# Patient Record
Sex: FEMALE | Race: BLACK OR AFRICAN AMERICAN | NOT HISPANIC OR LATINO
[De-identification: names, ages, dates, MRNs, and addresses within clinical notes are randomized per-mention and may not be internally consistent; named-entity substitution may affect disease eponyms.]

---

## 2021-07-12 ENCOUNTER — APPOINTMENT (OUTPATIENT)
Dept: OBGYN | Facility: CLINIC | Age: 36
End: 2021-07-12
Payer: COMMERCIAL

## 2021-07-12 ENCOUNTER — LABORATORY RESULT (OUTPATIENT)
Age: 36
End: 2021-07-12

## 2021-07-12 DIAGNOSIS — Z86.2 PERSONAL HISTORY OF DISEASES OF THE BLOOD AND BLOOD-FORMING ORGANS AND CERTAIN DISORDERS INVOLVING THE IMMUNE MECHANISM: ICD-10-CM

## 2021-07-12 DIAGNOSIS — Z80.0 FAMILY HISTORY OF MALIGNANT NEOPLASM OF DIGESTIVE ORGANS: ICD-10-CM

## 2021-07-12 DIAGNOSIS — Z78.9 OTHER SPECIFIED HEALTH STATUS: ICD-10-CM

## 2021-07-12 DIAGNOSIS — Z87.09 PERSONAL HISTORY OF OTHER DISEASES OF THE RESPIRATORY SYSTEM: ICD-10-CM

## 2021-07-12 DIAGNOSIS — Z83.3 FAMILY HISTORY OF DIABETES MELLITUS: ICD-10-CM

## 2021-07-12 PROCEDURE — 99385 PREV VISIT NEW AGE 18-39: CPT

## 2021-07-12 PROCEDURE — 99072 ADDL SUPL MATRL&STAF TM PHE: CPT

## 2021-07-12 NOTE — PHYSICAL EXAM
[Appropriately responsive] : appropriately responsive [Alert] : alert [No Acute Distress] : no acute distress [No Lymphadenopathy] : no lymphadenopathy [Regular Rate Rhythm] : regular rate rhythm [No Murmurs] : no murmurs [Clear to Auscultation B/L] : clear to auscultation bilaterally [Soft] : soft [Non-tender] : non-tender [Non-distended] : non-distended [No HSM] : No HSM [No Lesions] : no lesions [No Mass] : no mass [Oriented x3] : oriented x3 [Examination Of The Breasts] : a normal appearance [No Masses] : no breast masses were palpable [Labia Majora] : normal [Labia Minora] : normal [Normal] : normal [Uterine Adnexae] : normal [Discharge] : a  ~M vaginal discharge was present [White] : white [Thick] : thick [Moderate] : There was moderate vaginal bleeding [FreeTextEntry2] : pink macerated labia, perinium and perianal  skin changed

## 2021-07-12 NOTE — HISTORY OF PRESENT ILLNESS
[FreeTextEntry1] : 35yo P4  LMP here  for annual exam\par groin rash x 2 weeks w notable thick  whit discharge\par \par w BF x 4 years\par \par \par \par \par 1) VAVD girl  38wk ,   IOL oligohydramnios---  OP presentaition\par 2) 29 wk s  girl, \par 3) girl : 39wk \par 4)  boy 40 wks , NVSD

## 2021-07-19 DIAGNOSIS — N76.0 ACUTE VAGINITIS: ICD-10-CM

## 2021-07-19 DIAGNOSIS — B96.89 ACUTE VAGINITIS: ICD-10-CM

## 2021-07-19 LAB
C TRACH RRNA SPEC QL NAA+PROBE: NOT DETECTED
CANDIDA VAG CYTO: DETECTED
CYTOLOGY CVX/VAG DOC THIN PREP: ABNORMAL
G VAGINALIS+PREV SP MTYP VAG QL MICRO: DETECTED
N GONORRHOEA RRNA SPEC QL NAA+PROBE: NOT DETECTED
SOURCE AMPLIFICATION: NORMAL
T VAGINALIS VAG QL WET PREP: NOT DETECTED

## 2021-08-09 ENCOUNTER — TRANSCRIPTION ENCOUNTER (OUTPATIENT)
Age: 36
End: 2021-08-09

## 2021-08-26 ENCOUNTER — APPOINTMENT (OUTPATIENT)
Dept: OBGYN | Facility: CLINIC | Age: 36
End: 2021-08-26
Payer: COMMERCIAL

## 2021-08-26 VITALS
SYSTOLIC BLOOD PRESSURE: 132 MMHG | HEIGHT: 66 IN | DIASTOLIC BLOOD PRESSURE: 74 MMHG | BODY MASS INDEX: 35.03 KG/M2 | WEIGHT: 218 LBS

## 2021-08-26 DIAGNOSIS — Z00.00 ENCOUNTER FOR GENERAL ADULT MEDICAL EXAMINATION W/OUT ABNORMAL FINDINGS: ICD-10-CM

## 2021-08-26 DIAGNOSIS — R87.610 ATYPICAL SQUAMOUS CELLS OF UNDETERMINED SIGNIFICANCE ON CYTOLOGIC SMEAR OF CERVIX (ASC-US): ICD-10-CM

## 2021-08-26 DIAGNOSIS — R87.810 ATYPICAL SQUAMOUS CELLS OF UNDETERMINED SIGNIFICANCE ON CYTOLOGIC SMEAR OF CERVIX (ASC-US): ICD-10-CM

## 2021-08-26 LAB — HPV HIGH+LOW RISK DNA PNL CVX: DETECTED

## 2021-08-26 PROCEDURE — 57454 BX/CURETT OF CERVIX W/SCOPE: CPT

## 2021-08-26 NOTE — PROCEDURE
[Colposcopy] : Colposcopy  [Risks] : risks [Benefits] : benefits [Patient] : patient [Bleeding] : bleeding [ASCUS] : ASCUS [HPV High Risk] : HPV high risk [No Premedication] : no premedication [Pap Performed] : pap performed [SCI Fully Visualized] : SCI not fully visualized [ECC Performed] : ECC performed [No Abnormalities] : no abnormalities [Lesion] : lesion seen [Hemostasis Obtained] : Hemostasis obtained [Tolerated Well] : the patient tolerated the procedure well [de-identified] : 1 [de-identified] : 11: 30 far [de-identified] : nesss @ 11: 30 [de-identified] : pressure

## 2021-09-22 ENCOUNTER — NON-APPOINTMENT (OUTPATIENT)
Age: 36
End: 2021-09-22

## 2021-11-12 ENCOUNTER — OUTPATIENT (OUTPATIENT)
Dept: OUTPATIENT SERVICES | Facility: HOSPITAL | Age: 36
LOS: 1 days | End: 2021-11-12
Payer: COMMERCIAL

## 2021-11-12 ENCOUNTER — NON-APPOINTMENT (OUTPATIENT)
Age: 36
End: 2021-11-12

## 2021-11-12 VITALS
WEIGHT: 218.04 LBS | OXYGEN SATURATION: 100 % | RESPIRATION RATE: 14 BRPM | HEART RATE: 73 BPM | TEMPERATURE: 98 F | SYSTOLIC BLOOD PRESSURE: 126 MMHG | DIASTOLIC BLOOD PRESSURE: 88 MMHG | HEIGHT: 65 IN

## 2021-11-12 DIAGNOSIS — R87.610 ATYPICAL SQUAMOUS CELLS OF UNDETERMINED SIGNIFICANCE ON CYTOLOGIC SMEAR OF CERVIX (ASC-US): ICD-10-CM

## 2021-11-12 DIAGNOSIS — Z01.818 ENCOUNTER FOR OTHER PREPROCEDURAL EXAMINATION: ICD-10-CM

## 2021-11-12 DIAGNOSIS — Z98.51 TUBAL LIGATION STATUS: Chronic | ICD-10-CM

## 2021-11-12 LAB
ANION GAP SERPL CALC-SCNC: 14 MMOL/L — SIGNIFICANT CHANGE UP (ref 5–17)
BUN SERPL-MCNC: 9 MG/DL — SIGNIFICANT CHANGE UP (ref 7–23)
CALCIUM SERPL-MCNC: 8.8 MG/DL — SIGNIFICANT CHANGE UP (ref 8.4–10.5)
CHLORIDE SERPL-SCNC: 102 MMOL/L — SIGNIFICANT CHANGE UP (ref 96–108)
CO2 SERPL-SCNC: 24 MMOL/L — SIGNIFICANT CHANGE UP (ref 22–31)
CREAT SERPL-MCNC: 0.56 MG/DL — SIGNIFICANT CHANGE UP (ref 0.5–1.3)
GLUCOSE SERPL-MCNC: 88 MG/DL — SIGNIFICANT CHANGE UP (ref 70–99)
HCT VFR BLD CALC: 24.4 % — LOW (ref 34.5–45)
HGB BLD-MCNC: 6.4 G/DL — CRITICAL LOW (ref 11.5–15.5)
MCHC RBC-ENTMCNC: 14.7 PG — LOW (ref 27–34)
MCHC RBC-ENTMCNC: 26.2 GM/DL — LOW (ref 32–36)
MCV RBC AUTO: 56.2 FL — LOW (ref 80–100)
NRBC # BLD: 0 /100 WBCS — SIGNIFICANT CHANGE UP (ref 0–0)
PLATELET # BLD AUTO: 629 K/UL — HIGH (ref 150–400)
POTASSIUM SERPL-MCNC: 3.4 MMOL/L — LOW (ref 3.5–5.3)
POTASSIUM SERPL-SCNC: 3.4 MMOL/L — LOW (ref 3.5–5.3)
RBC # BLD: 4.34 M/UL — SIGNIFICANT CHANGE UP (ref 3.8–5.2)
RBC # FLD: 21.3 % — HIGH (ref 10.3–14.5)
SODIUM SERPL-SCNC: 140 MMOL/L — SIGNIFICANT CHANGE UP (ref 135–145)
WBC # BLD: 5.54 K/UL — SIGNIFICANT CHANGE UP (ref 3.8–10.5)
WBC # FLD AUTO: 5.54 K/UL — SIGNIFICANT CHANGE UP (ref 3.8–10.5)

## 2021-11-12 PROCEDURE — G0463: CPT

## 2021-11-12 PROCEDURE — 80048 BASIC METABOLIC PNL TOTAL CA: CPT

## 2021-11-12 PROCEDURE — 85027 COMPLETE CBC AUTOMATED: CPT

## 2021-11-12 RX ORDER — SODIUM CHLORIDE 9 MG/ML
3 INJECTION INTRAMUSCULAR; INTRAVENOUS; SUBCUTANEOUS EVERY 8 HOURS
Refills: 0 | Status: DISCONTINUED | OUTPATIENT
Start: 2021-11-26 | End: 2021-12-10

## 2021-11-12 RX ORDER — LIDOCAINE HCL 20 MG/ML
0.2 VIAL (ML) INJECTION ONCE
Refills: 0 | Status: DISCONTINUED | OUTPATIENT
Start: 2021-11-26 | End: 2021-12-10

## 2021-11-12 NOTE — H&P PST ADULT - ATTENDING COMMENTS
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  35yo P4  LMP ~ 3 wks ago her for cone biopsy for high grad JENNY treatment and diagnostic procedure  reviewed risk of infection, bleeding, pain, injury to surrounding anatomy  discussed management depending on path results  discussed pelvic rest ~ 6-8 wk w anticipated  discharge and mild bleeding x 2 weeks  discussed indication to return to office sooner that scheduled f/u    discussed effects in fertility    home  w tylenol and ibuprofen for pain  f/u in 2 and 6 weeks postop    s/p FE and transfusion for severe iron def anemia                        9.4    6.21  )-----------( 186      ( 26 Nov 2021 11:21 )             33.1

## 2021-11-12 NOTE — H&P PST ADULT - HISTORY OF PRESENT ILLNESS
37 yo female. . LMP 2021. recently had abnormal Pap Smear, ASCUS and HPV18+. now presents to PST scheduled for cone knife biopsy on .   covid test 35 yo female. . LMP 2021. recently had abnormal Pap Smear, ASCUS and HPV18+. now presents to PST scheduled for cone knife biopsy on .   covid test scheduled on  at [facility]. 37 yo female. . LMP 2021. recently had abnormal Pap Smear, ASCUS and HPV18+. now presents to PST scheduled for cone knife biopsy on .   covid test scheduled on  at [facility].  **H/H=6.4/24.4, serum potasssium=3.4, notified Dr. Hernandez via phone, requested Dr. Hernandez's office to call PST back to update plan of care.** 35 yo female. . LMP 2021. recently had abnormal Pap Smear, ASCUS and HPV18+. now presents to PST scheduled for cone knife biopsy on .   covid test scheduled on  at [facility].  **H/H=6.4/24.4, serum potasssium=3.4, notified Dr. Hernandez via phone and email, requested Dr. Hernandez's office to call PST back to update plan of care.**

## 2021-11-15 ENCOUNTER — NON-APPOINTMENT (OUTPATIENT)
Age: 36
End: 2021-11-15

## 2021-11-16 ENCOUNTER — OUTPATIENT (OUTPATIENT)
Dept: OUTPATIENT SERVICES | Facility: HOSPITAL | Age: 36
LOS: 1 days | Discharge: HOME | End: 2021-11-16

## 2021-11-16 DIAGNOSIS — Z01.818 ENCOUNTER FOR OTHER PREPROCEDURAL EXAMINATION: ICD-10-CM

## 2021-11-16 DIAGNOSIS — Z98.51 TUBAL LIGATION STATUS: Chronic | ICD-10-CM

## 2021-11-17 ENCOUNTER — NON-APPOINTMENT (OUTPATIENT)
Age: 36
End: 2021-11-17

## 2021-11-17 ENCOUNTER — RESULT REVIEW (OUTPATIENT)
Age: 36
End: 2021-11-17

## 2021-11-17 ENCOUNTER — OUTPATIENT (OUTPATIENT)
Dept: OUTPATIENT SERVICES | Facility: HOSPITAL | Age: 36
LOS: 1 days | End: 2021-11-17
Payer: COMMERCIAL

## 2021-11-17 ENCOUNTER — APPOINTMENT (OUTPATIENT)
Dept: HEMATOLOGY ONCOLOGY | Facility: CLINIC | Age: 36
End: 2021-11-17
Payer: COMMERCIAL

## 2021-11-17 VITALS
DIASTOLIC BLOOD PRESSURE: 70 MMHG | HEART RATE: 82 BPM | RESPIRATION RATE: 18 BRPM | BODY MASS INDEX: 34.24 KG/M2 | OXYGEN SATURATION: 98 % | TEMPERATURE: 97.7 F | HEIGHT: 66.73 IN | WEIGHT: 215.61 LBS | SYSTOLIC BLOOD PRESSURE: 112 MMHG

## 2021-11-17 DIAGNOSIS — D50.9 IRON DEFICIENCY ANEMIA, UNSPECIFIED: ICD-10-CM

## 2021-11-17 DIAGNOSIS — Z98.51 TUBAL LIGATION STATUS: Chronic | ICD-10-CM

## 2021-11-17 PROCEDURE — 86900 BLOOD TYPING SEROLOGIC ABO: CPT

## 2021-11-17 PROCEDURE — 86850 RBC ANTIBODY SCREEN: CPT

## 2021-11-17 PROCEDURE — 86923 COMPATIBILITY TEST ELECTRIC: CPT

## 2021-11-17 PROCEDURE — 86901 BLOOD TYPING SEROLOGIC RH(D): CPT

## 2021-11-17 PROCEDURE — 99204 OFFICE O/P NEW MOD 45 MIN: CPT

## 2021-11-18 LAB
ALBUMIN SERPL ELPH-MCNC: 4.1 G/DL
ALP BLD-CCNC: 81 U/L
ALT SERPL-CCNC: 12 U/L
ANION GAP SERPL CALC-SCNC: 14 MMOL/L
AST SERPL-CCNC: 10 U/L
BILIRUB SERPL-MCNC: 0.3 MG/DL
BUN SERPL-MCNC: 8 MG/DL
CALCIUM SERPL-MCNC: 9.1 MG/DL
CHLORIDE SERPL-SCNC: 105 MMOL/L
CO2 SERPL-SCNC: 22 MMOL/L
CORE LAB BIOPSY: NORMAL
CREAT SERPL-MCNC: 0.57 MG/DL
FERRITIN SERPL-MCNC: 10 NG/ML
FOLATE SERPL-MCNC: 15.8 NG/ML
GLUCOSE SERPL-MCNC: 97 MG/DL
IRON SATN MFR SERPL: 2 %
IRON SERPL-MCNC: 9 UG/DL
POTASSIUM SERPL-SCNC: 3.5 MMOL/L
PROT SERPL-MCNC: 7.4 G/DL
SODIUM SERPL-SCNC: 141 MMOL/L
TIBC SERPL-MCNC: 565 UG/DL
UIBC SERPL-MCNC: 556 UG/DL
VIT B12 SERPL-MCNC: 288 PG/ML

## 2021-11-18 NOTE — ASSESSMENT
[FreeTextEntry1] : This is a 36 year old woman with a history of anemia for years. Baseline Hg 9.6g/dl, more recently fell to 6.4g/dl.  Menstrual periods described by patient as heavy.  Will check ferritin and TIBC confirm iron deficiency. SHe does have a child that is said to have a thalassemia trait, will run hemoglobin electrophoresis to see if she has this as well.  Upcoming cone biopsy on 11/26/21.  It is entirely possible that even an immediate dose of IV iron which we are not prepared to do today could not improve her hemoglobin fast enough to get the Hg > 8 to make the procedure optimal.  Would begin with a 2 unit PRBC transfusion which should be sufficient to accomplish this.  SHe would still be Iron deficient following this transfusion too, will have her set up for 3 doses of IV venofer after the procedure.  Asked her to let us know if she is weary of IV iron infusion during side effects, we do still have the option of stating PO iron instead after procedure given less urgency at that point. \par \par Addendum \par 11/18/21\par Called patient re: bloodowork. Informed her that the Hg was stable though still quite low. Hg 6.7g/dl up from 6.4, ferritin 2 confirming iron deficiency, instructed her to start a PO iron tablet 65mg ferrous sulfate daily when she can would help improve the hemoglobin slightly on top of the upcoming transfusion to be done on 11/20/21. Following this she is hematologically cleared nd optimized for cone biopsy on 11/26/21.

## 2021-11-18 NOTE — HISTORY OF PRESENT ILLNESS
[de-identified] : This is a 36 year old woman with a history of anemia.  baseline Hg 9.6g/dl more recently fell to 6.4 on .  Patient was instructed to take vitamins but does not recall an iron tablets.  \par Patient had a heavy menstrual period but GYN had thought it was fairly regular\par \par Days 2-5 periods heavier, uses 4 pads a day, and then last s 6 days today.  \par Has been this pattern for most of her life. Menarche at age 11.  \par Had 4 children A1.  \par \par One of her children has a low iron and a  thalassemia trait.  \par \par Patient going for surgery next week.  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  \par

## 2021-11-19 ENCOUNTER — NON-APPOINTMENT (OUTPATIENT)
Age: 36
End: 2021-11-19

## 2021-11-20 ENCOUNTER — RESULT REVIEW (OUTPATIENT)
Age: 36
End: 2021-11-20

## 2021-11-20 ENCOUNTER — APPOINTMENT (OUTPATIENT)
Dept: INFUSION THERAPY | Facility: HOSPITAL | Age: 36
End: 2021-11-20

## 2021-11-22 DIAGNOSIS — Z51.89 ENCOUNTER FOR OTHER SPECIFIED AFTERCARE: ICD-10-CM

## 2021-11-22 DIAGNOSIS — D50.9 IRON DEFICIENCY ANEMIA, UNSPECIFIED: ICD-10-CM

## 2021-11-24 ENCOUNTER — OUTPATIENT (OUTPATIENT)
Dept: OUTPATIENT SERVICES | Facility: HOSPITAL | Age: 36
LOS: 1 days | End: 2021-11-24
Payer: COMMERCIAL

## 2021-11-24 DIAGNOSIS — Z98.51 TUBAL LIGATION STATUS: Chronic | ICD-10-CM

## 2021-11-24 DIAGNOSIS — Z11.52 ENCOUNTER FOR SCREENING FOR COVID-19: ICD-10-CM

## 2021-11-24 LAB — SARS-COV-2 RNA SPEC QL NAA+PROBE: SIGNIFICANT CHANGE UP

## 2021-11-24 PROCEDURE — C9803: CPT

## 2021-11-24 PROCEDURE — U0003: CPT

## 2021-11-24 PROCEDURE — U0005: CPT

## 2021-11-25 ENCOUNTER — TRANSCRIPTION ENCOUNTER (OUTPATIENT)
Age: 36
End: 2021-11-25

## 2021-11-25 RX ORDER — IBUPROFEN 200 MG
600 TABLET ORAL ONCE
Refills: 0 | Status: DISCONTINUED | OUTPATIENT
Start: 2021-11-26 | End: 2021-12-10

## 2021-11-25 RX ORDER — OXYCODONE HYDROCHLORIDE 5 MG/1
5 TABLET ORAL ONCE
Refills: 0 | Status: DISCONTINUED | OUTPATIENT
Start: 2021-11-26 | End: 2021-11-26

## 2021-11-25 RX ORDER — ONDANSETRON 8 MG/1
4 TABLET, FILM COATED ORAL ONCE
Refills: 0 | Status: DISCONTINUED | OUTPATIENT
Start: 2021-11-26 | End: 2021-12-10

## 2021-11-25 RX ORDER — SODIUM CHLORIDE 9 MG/ML
1000 INJECTION, SOLUTION INTRAVENOUS
Refills: 0 | Status: DISCONTINUED | OUTPATIENT
Start: 2021-11-26 | End: 2021-12-10

## 2021-11-26 ENCOUNTER — OUTPATIENT (OUTPATIENT)
Dept: OUTPATIENT SERVICES | Facility: HOSPITAL | Age: 36
LOS: 1 days | End: 2021-11-26
Payer: COMMERCIAL

## 2021-11-26 ENCOUNTER — RESULT REVIEW (OUTPATIENT)
Age: 36
End: 2021-11-26

## 2021-11-26 ENCOUNTER — APPOINTMENT (OUTPATIENT)
Dept: OBGYN | Facility: CLINIC | Age: 36
End: 2021-11-26

## 2021-11-26 VITALS
OXYGEN SATURATION: 100 % | RESPIRATION RATE: 14 BRPM | HEART RATE: 72 BPM | TEMPERATURE: 97 F | SYSTOLIC BLOOD PRESSURE: 154 MMHG | DIASTOLIC BLOOD PRESSURE: 89 MMHG

## 2021-11-26 VITALS
DIASTOLIC BLOOD PRESSURE: 89 MMHG | OXYGEN SATURATION: 100 % | RESPIRATION RATE: 18 BRPM | WEIGHT: 218.04 LBS | HEART RATE: 64 BPM | TEMPERATURE: 98 F | SYSTOLIC BLOOD PRESSURE: 142 MMHG | HEIGHT: 65 IN

## 2021-11-26 DIAGNOSIS — Z98.51 TUBAL LIGATION STATUS: Chronic | ICD-10-CM

## 2021-11-26 DIAGNOSIS — R87.610 ATYPICAL SQUAMOUS CELLS OF UNDETERMINED SIGNIFICANCE ON CYTOLOGIC SMEAR OF CERVIX (ASC-US): ICD-10-CM

## 2021-11-26 DIAGNOSIS — N87.1 MODERATE CERVICAL DYSPLASIA: ICD-10-CM

## 2021-11-26 LAB
ANION GAP SERPL CALC-SCNC: 13 MMOL/L — SIGNIFICANT CHANGE UP (ref 5–17)
BLD GP AB SCN SERPL QL: NEGATIVE — SIGNIFICANT CHANGE UP
BUN SERPL-MCNC: 6 MG/DL — LOW (ref 7–23)
CALCIUM SERPL-MCNC: 8.7 MG/DL — SIGNIFICANT CHANGE UP (ref 8.4–10.5)
CHLORIDE SERPL-SCNC: 104 MMOL/L — SIGNIFICANT CHANGE UP (ref 96–108)
CO2 SERPL-SCNC: 23 MMOL/L — SIGNIFICANT CHANGE UP (ref 22–31)
CREAT SERPL-MCNC: 0.61 MG/DL — SIGNIFICANT CHANGE UP (ref 0.5–1.3)
GLUCOSE SERPL-MCNC: 84 MG/DL — SIGNIFICANT CHANGE UP (ref 70–99)
HCT VFR BLD CALC: 33.1 % — LOW (ref 34.5–45)
HGB BLD-MCNC: 9.4 G/DL — LOW (ref 11.5–15.5)
MCHC RBC-ENTMCNC: 17.7 PG — LOW (ref 27–34)
MCHC RBC-ENTMCNC: 28.4 GM/DL — LOW (ref 32–36)
MCV RBC AUTO: 62.5 FL — LOW (ref 80–100)
NRBC # BLD: 0 /100 WBCS — SIGNIFICANT CHANGE UP (ref 0–0)
PLATELET # BLD AUTO: 186 K/UL — SIGNIFICANT CHANGE UP (ref 150–400)
POTASSIUM SERPL-MCNC: 3.6 MMOL/L — SIGNIFICANT CHANGE UP (ref 3.5–5.3)
POTASSIUM SERPL-SCNC: 3.6 MMOL/L — SIGNIFICANT CHANGE UP (ref 3.5–5.3)
RBC # BLD: 5.3 M/UL — HIGH (ref 3.8–5.2)
RBC # FLD: 30 % — HIGH (ref 10.3–14.5)
RH IG SCN BLD-IMP: POSITIVE — SIGNIFICANT CHANGE UP
SODIUM SERPL-SCNC: 140 MMOL/L — SIGNIFICANT CHANGE UP (ref 135–145)
WBC # BLD: 6.21 K/UL — SIGNIFICANT CHANGE UP (ref 3.8–10.5)
WBC # FLD AUTO: 6.21 K/UL — SIGNIFICANT CHANGE UP (ref 3.8–10.5)

## 2021-11-26 PROCEDURE — 85027 COMPLETE CBC AUTOMATED: CPT

## 2021-11-26 PROCEDURE — C1889: CPT

## 2021-11-26 PROCEDURE — 80048 BASIC METABOLIC PNL TOTAL CA: CPT

## 2021-11-26 PROCEDURE — 86850 RBC ANTIBODY SCREEN: CPT

## 2021-11-26 PROCEDURE — 86900 BLOOD TYPING SEROLOGIC ABO: CPT

## 2021-11-26 PROCEDURE — 86901 BLOOD TYPING SEROLOGIC RH(D): CPT

## 2021-11-26 PROCEDURE — 88305 TISSUE EXAM BY PATHOLOGIST: CPT | Mod: 26

## 2021-11-26 PROCEDURE — 57520 CONIZATION OF CERVIX: CPT

## 2021-11-26 PROCEDURE — 88307 TISSUE EXAM BY PATHOLOGIST: CPT | Mod: 26

## 2021-11-26 PROCEDURE — 88305 TISSUE EXAM BY PATHOLOGIST: CPT

## 2021-11-26 PROCEDURE — 88307 TISSUE EXAM BY PATHOLOGIST: CPT

## 2021-11-26 RX ORDER — IBUPROFEN 200 MG
1 TABLET ORAL
Qty: 0 | Refills: 0 | DISCHARGE
Start: 2021-11-26

## 2021-11-26 RX ADMIN — OXYCODONE HYDROCHLORIDE 5 MILLIGRAM(S): 5 TABLET ORAL at 15:09

## 2021-11-26 NOTE — BRIEF OPERATIVE NOTE - OPERATION/FINDINGS
EUA revealed anteverted 8 week sized uterus. Adnexa not palpable bilaterally. No aceto white changes with application of acetic acid. Additional bleeding noted after remaining cervical tissue cauterized, Monsel's applied. Bleeding noted after application of Monsel's. Additional wide sutures thrown from 4 to 2 o'clock and 10 to 8 o'clock with resolution of bleeding. Good hemostasis noted. Surgicel then inserted and tied in with stay sutures.

## 2021-11-26 NOTE — PRE-ANESTHESIA EVALUATION ADULT - NSANTHOSAYNRD_GEN_A_CORE
No. ALESIA screening performed.  STOP BANG Legend: 0-2 = LOW Risk; 3-4 = INTERMEDIATE Risk; 5-8 = HIGH Risk

## 2021-11-26 NOTE — ASU DISCHARGE PLAN (ADULT/PEDIATRIC) - CARE PROVIDER_API CALL
Lina Hernandez)  OBSN  General  [address] [address]  Phone: [phone]  Fax: [phone]  Follow Up Time: 2 weeks

## 2021-11-30 LAB
HGB A MFR BLD: 98.3 %
HGB A2 MFR BLD: 1.7 %
HGB FRACT BLD-IMP: NORMAL

## 2021-12-03 ENCOUNTER — APPOINTMENT (OUTPATIENT)
Dept: INFUSION THERAPY | Facility: HOSPITAL | Age: 36
End: 2021-12-03

## 2021-12-03 ENCOUNTER — RESULT REVIEW (OUTPATIENT)
Age: 36
End: 2021-12-03

## 2021-12-03 ENCOUNTER — NON-APPOINTMENT (OUTPATIENT)
Age: 36
End: 2021-12-03

## 2021-12-03 ENCOUNTER — LABORATORY RESULT (OUTPATIENT)
Age: 36
End: 2021-12-03

## 2021-12-06 ENCOUNTER — APPOINTMENT (OUTPATIENT)
Dept: OBGYN | Facility: CLINIC | Age: 36
End: 2021-12-06
Payer: COMMERCIAL

## 2021-12-06 VITALS — BODY MASS INDEX: 34.42 KG/M2 | WEIGHT: 218 LBS | DIASTOLIC BLOOD PRESSURE: 80 MMHG | SYSTOLIC BLOOD PRESSURE: 118 MMHG

## 2021-12-06 PROCEDURE — 99024 POSTOP FOLLOW-UP VISIT: CPT

## 2021-12-06 RX ORDER — METRONIDAZOLE 7.5 MG/G
0.75 GEL VAGINAL
Qty: 1 | Refills: 1 | Status: COMPLETED | COMMUNITY
Start: 2021-07-19 | End: 2021-12-06

## 2021-12-06 NOTE — HISTORY OF PRESENT ILLNESS
[Pain is well-controlled] : pain is well-controlled [Fever] : no fever [Vaginal Bleeding] : vaginal bleeding [Pelvic Pressure] : no pelvic pressure [Dysuria] : no dysuria [Mild] : mild vaginal bleeding [Normal] : normal [de-identified] : cone biopsy [de-identified] : heavy vaginal bleeding from thursday to saturday, minimal bleeding today [de-identified] : cervix small ooze blood to touch, watery discharge (pink/red) in vaginal vault

## 2021-12-06 NOTE — PLAN
[No [place]] : to avoid sexual intercourse [No Tampons/Suppositories] : against using tampons or vaginal suppositories [No Sports] : not to participate in sports [FreeTextEntry1] : ~ 10 days post op\par pathology pending\par \par heavy bleeding resolved--unsure if menses or postop bleeding, will observe\par \par f/u Fe w heme pending this week\par no abd pain on exam \par \par RTO in 4-6 weeks for reeval healing if no other complaints

## 2021-12-06 NOTE — ASSESSMENT
[Fair Pain Control] : has fair pain control [No Sign of Infection] : is showing no signs of infection [Slow Progress] : is progressing slowly

## 2021-12-07 ENCOUNTER — APPOINTMENT (OUTPATIENT)
Dept: INFUSION THERAPY | Facility: HOSPITAL | Age: 36
End: 2021-12-07

## 2021-12-07 ENCOUNTER — LABORATORY RESULT (OUTPATIENT)
Age: 36
End: 2021-12-07

## 2021-12-07 LAB — SURGICAL PATHOLOGY STUDY: SIGNIFICANT CHANGE UP

## 2021-12-08 ENCOUNTER — NON-APPOINTMENT (OUTPATIENT)
Age: 36
End: 2021-12-08

## 2021-12-14 ENCOUNTER — RESULT REVIEW (OUTPATIENT)
Age: 36
End: 2021-12-14

## 2021-12-14 ENCOUNTER — APPOINTMENT (OUTPATIENT)
Dept: INFUSION THERAPY | Facility: HOSPITAL | Age: 36
End: 2021-12-14

## 2021-12-14 ENCOUNTER — LABORATORY RESULT (OUTPATIENT)
Age: 36
End: 2021-12-14

## 2021-12-27 ENCOUNTER — NON-APPOINTMENT (OUTPATIENT)
Age: 36
End: 2021-12-27

## 2022-01-07 ENCOUNTER — APPOINTMENT (OUTPATIENT)
Dept: OBGYN | Facility: CLINIC | Age: 37
End: 2022-01-07
Payer: COMMERCIAL

## 2022-01-07 VITALS — SYSTOLIC BLOOD PRESSURE: 120 MMHG | WEIGHT: 218 LBS | DIASTOLIC BLOOD PRESSURE: 60 MMHG | BODY MASS INDEX: 34.42 KG/M2

## 2022-01-07 DIAGNOSIS — Z09 ENCOUNTER FOR FOLLOW-UP EXAMINATION AFTER COMPLETED TREATMENT FOR CONDITIONS OTHER THAN MALIGNANT NEOPLASM: ICD-10-CM

## 2022-01-07 PROCEDURE — 99024 POSTOP FOLLOW-UP VISIT: CPT

## 2022-01-07 NOTE — HISTORY OF PRESENT ILLNESS
[Fever] : no fever [Vaginal Bleeding] : no vaginal bleeding [Pelvic Pressure] : no pelvic pressure [Dysuria] : no dysuria [Clean/Dry/Intact] : clean, dry and intact [Healed] : not healed [Mild] : mild vaginal bleeding [Pathology reviewed] : pathology reviewed [de-identified] : LMP 2 days ago

## 2022-02-01 ENCOUNTER — TRANSCRIPTION ENCOUNTER (OUTPATIENT)
Age: 37
End: 2022-02-01

## 2022-03-24 ENCOUNTER — OUTPATIENT (OUTPATIENT)
Dept: OUTPATIENT SERVICES | Facility: HOSPITAL | Age: 37
LOS: 1 days | Discharge: HOME | End: 2022-03-24

## 2022-03-24 DIAGNOSIS — D50.9 IRON DEFICIENCY ANEMIA, UNSPECIFIED: ICD-10-CM

## 2022-03-24 DIAGNOSIS — Z98.51 TUBAL LIGATION STATUS: Chronic | ICD-10-CM

## 2022-03-28 ENCOUNTER — APPOINTMENT (OUTPATIENT)
Dept: HEMATOLOGY ONCOLOGY | Facility: CLINIC | Age: 37
End: 2022-03-28

## 2022-04-28 ENCOUNTER — APPOINTMENT (OUTPATIENT)
Dept: OBGYN | Facility: CLINIC | Age: 37
End: 2022-04-28
Payer: COMMERCIAL

## 2022-04-28 VITALS
BODY MASS INDEX: 36.64 KG/M2 | SYSTOLIC BLOOD PRESSURE: 128 MMHG | WEIGHT: 228 LBS | DIASTOLIC BLOOD PRESSURE: 85 MMHG | HEIGHT: 66 IN

## 2022-04-28 PROCEDURE — 99212 OFFICE O/P EST SF 10 MIN: CPT

## 2022-05-03 LAB
CYTOLOGY CVX/VAG DOC THIN PREP: ABNORMAL
HPV HIGH+LOW RISK DNA PNL CVX: NOT DETECTED

## 2022-07-21 ENCOUNTER — NON-APPOINTMENT (OUTPATIENT)
Age: 37
End: 2022-07-21

## 2022-07-21 DIAGNOSIS — B37.2 CANDIDIASIS OF SKIN AND NAIL: ICD-10-CM

## 2022-11-29 ENCOUNTER — APPOINTMENT (OUTPATIENT)
Dept: OBGYN | Facility: CLINIC | Age: 37
End: 2022-11-29

## 2022-11-29 VITALS — BODY MASS INDEX: 38.74 KG/M2 | SYSTOLIC BLOOD PRESSURE: 134 MMHG | WEIGHT: 240 LBS | DIASTOLIC BLOOD PRESSURE: 82 MMHG

## 2022-11-29 DIAGNOSIS — N94.10 UNSPECIFIED DYSPAREUNIA: ICD-10-CM

## 2022-11-29 PROCEDURE — 99214 OFFICE O/P EST MOD 30 MIN: CPT

## 2022-11-29 NOTE — PLAN
[FreeTextEntry1] : pelvic sono and r/o PID\par \par repeat pap in 6 months\par \par pt to do sono, telethealth to review and make plan

## 2022-11-29 NOTE — HISTORY OF PRESENT ILLNESS
[FreeTextEntry1] : 38yo P4 lmp here for f/u pap for recent cone biopsy for CIN2 (6 month f/u)\par \par pre reports ~ 6 months of heavy bleeding on day 2-3 mensesd  and cramping--new and pain w sex \par pt notedes deep pain, she thinks started before cone biopsy???\par no discharge\par no dyuria

## 2022-11-29 NOTE — PHYSICAL EXAM
[Chaperone Declined] : Patient declined chaperone [Appropriately responsive] : appropriately responsive [Alert] : alert [No Acute Distress] : no acute distress [No Murmurs] : no murmurs [Soft] : soft [Non-tender] : non-tender [Non-distended] : non-distended [No HSM] : No HSM [No Lesions] : no lesions [No Mass] : no mass [Oriented x3] : oriented x3 [Labia Majora] : normal [Labia Minora] : normal [Normal] : normal [Uterine Adnexae] : normal [FreeTextEntry5] : tender on bimanual, no CMT

## 2022-12-06 LAB
C TRACH RRNA SPEC QL NAA+PROBE: NOT DETECTED
HPV HIGH+LOW RISK DNA PNL CVX: NOT DETECTED
N GONORRHOEA RRNA SPEC QL NAA+PROBE: NOT DETECTED
SOURCE AMPLIFICATION: NORMAL

## 2022-12-12 LAB — CYTOLOGY CVX/VAG DOC THIN PREP: ABNORMAL

## 2022-12-13 ENCOUNTER — RESULT REVIEW (OUTPATIENT)
Age: 37
End: 2022-12-13

## 2022-12-13 ENCOUNTER — APPOINTMENT (OUTPATIENT)
Dept: ULTRASOUND IMAGING | Facility: CLINIC | Age: 37
End: 2022-12-13

## 2022-12-13 ENCOUNTER — OUTPATIENT (OUTPATIENT)
Dept: OUTPATIENT SERVICES | Facility: HOSPITAL | Age: 37
LOS: 1 days | End: 2022-12-13
Payer: COMMERCIAL

## 2022-12-13 DIAGNOSIS — Z00.8 ENCOUNTER FOR OTHER GENERAL EXAMINATION: ICD-10-CM

## 2022-12-13 DIAGNOSIS — N94.10 UNSPECIFIED DYSPAREUNIA: ICD-10-CM

## 2022-12-13 DIAGNOSIS — Z98.51 TUBAL LIGATION STATUS: Chronic | ICD-10-CM

## 2022-12-13 DIAGNOSIS — N92.0 EXCESSIVE AND FREQUENT MENSTRUATION WITH REGULAR CYCLE: ICD-10-CM

## 2022-12-13 PROCEDURE — 76830 TRANSVAGINAL US NON-OB: CPT | Mod: 26

## 2022-12-13 PROCEDURE — 76830 TRANSVAGINAL US NON-OB: CPT

## 2023-07-07 ENCOUNTER — INPATIENT (INPATIENT)
Facility: HOSPITAL | Age: 38
LOS: 8 days | Discharge: HOME | DRG: 552 | End: 2023-07-16
Attending: INTERNAL MEDICINE | Admitting: INTERNAL MEDICINE
Payer: COMMERCIAL

## 2023-07-07 VITALS
TEMPERATURE: 100 F | HEART RATE: 113 BPM | OXYGEN SATURATION: 99 % | WEIGHT: 229.94 LBS | RESPIRATION RATE: 20 BRPM | SYSTOLIC BLOOD PRESSURE: 144 MMHG | HEIGHT: 65 IN | DIASTOLIC BLOOD PRESSURE: 99 MMHG

## 2023-07-07 DIAGNOSIS — Z98.51 TUBAL LIGATION STATUS: Chronic | ICD-10-CM

## 2023-07-07 DIAGNOSIS — M54.9 DORSALGIA, UNSPECIFIED: ICD-10-CM

## 2023-07-07 LAB
ALBUMIN SERPL ELPH-MCNC: 3.5 G/DL — SIGNIFICANT CHANGE UP (ref 3.3–5)
ALP SERPL-CCNC: 72 U/L — SIGNIFICANT CHANGE UP (ref 40–120)
ALT FLD-CCNC: 16 U/L — SIGNIFICANT CHANGE UP (ref 10–45)
ANION GAP SERPL CALC-SCNC: 12 MMOL/L — SIGNIFICANT CHANGE UP (ref 5–17)
ANISOCYTOSIS BLD QL: SIGNIFICANT CHANGE UP
AST SERPL-CCNC: 56 U/L — HIGH (ref 10–40)
BASOPHILS # BLD AUTO: 0 K/UL — SIGNIFICANT CHANGE UP (ref 0–0.2)
BASOPHILS NFR BLD AUTO: 0 % — SIGNIFICANT CHANGE UP (ref 0–2)
BILIRUB SERPL-MCNC: 0.5 MG/DL — SIGNIFICANT CHANGE UP (ref 0.2–1.2)
BUN SERPL-MCNC: 8 MG/DL — SIGNIFICANT CHANGE UP (ref 7–23)
CALCIUM SERPL-MCNC: 8.8 MG/DL — SIGNIFICANT CHANGE UP (ref 8.4–10.5)
CHLORIDE SERPL-SCNC: 99 MMOL/L — SIGNIFICANT CHANGE UP (ref 96–108)
CO2 SERPL-SCNC: 25 MMOL/L — SIGNIFICANT CHANGE UP (ref 22–31)
CREAT SERPL-MCNC: 0.58 MG/DL — SIGNIFICANT CHANGE UP (ref 0.5–1.3)
CRP SERPL-MCNC: 91 MG/L — HIGH (ref 0–4)
DACRYOCYTES BLD QL SMEAR: SLIGHT — SIGNIFICANT CHANGE UP
EGFR: 119 ML/MIN/1.73M2 — SIGNIFICANT CHANGE UP
ELLIPTOCYTES BLD QL SMEAR: SLIGHT — SIGNIFICANT CHANGE UP
EOSINOPHIL # BLD AUTO: 0 K/UL — SIGNIFICANT CHANGE UP (ref 0–0.5)
EOSINOPHIL NFR BLD AUTO: 0 % — SIGNIFICANT CHANGE UP (ref 0–6)
GLUCOSE SERPL-MCNC: 88 MG/DL — SIGNIFICANT CHANGE UP (ref 70–99)
HCG SERPL-ACNC: <2 MIU/ML — SIGNIFICANT CHANGE UP
HCT VFR BLD CALC: 26.9 % — LOW (ref 34.5–45)
HGB BLD-MCNC: 7.2 G/DL — LOW (ref 11.5–15.5)
HYPOCHROMIA BLD QL: SIGNIFICANT CHANGE UP
LYMPHOCYTES # BLD AUTO: 2.64 K/UL — SIGNIFICANT CHANGE UP (ref 1–3.3)
LYMPHOCYTES # BLD AUTO: 21.7 % — SIGNIFICANT CHANGE UP (ref 13–44)
MANUAL SMEAR VERIFICATION: SIGNIFICANT CHANGE UP
MCHC RBC-ENTMCNC: 15.1 PG — LOW (ref 27–34)
MCHC RBC-ENTMCNC: 26.8 GM/DL — LOW (ref 32–36)
MCV RBC AUTO: 56.4 FL — LOW (ref 80–100)
MICROCYTES BLD QL: SIGNIFICANT CHANGE UP
MONOCYTES # BLD AUTO: 1.06 K/UL — HIGH (ref 0–0.9)
MONOCYTES NFR BLD AUTO: 8.7 % — SIGNIFICANT CHANGE UP (ref 2–14)
NEUTROPHILS # BLD AUTO: 8.48 K/UL — HIGH (ref 1.8–7.4)
NEUTROPHILS NFR BLD AUTO: 69.6 % — SIGNIFICANT CHANGE UP (ref 43–77)
NRBC # BLD: 1 /100 — HIGH (ref 0–0)
OVALOCYTES BLD QL SMEAR: SIGNIFICANT CHANGE UP
PLAT MORPH BLD: NORMAL — SIGNIFICANT CHANGE UP
PLATELET # BLD AUTO: 650 K/UL — HIGH (ref 150–400)
POIKILOCYTOSIS BLD QL AUTO: SIGNIFICANT CHANGE UP
POLYCHROMASIA BLD QL SMEAR: SLIGHT — SIGNIFICANT CHANGE UP
POTASSIUM SERPL-MCNC: 4.7 MMOL/L — SIGNIFICANT CHANGE UP (ref 3.5–5.3)
POTASSIUM SERPL-SCNC: 4.7 MMOL/L — SIGNIFICANT CHANGE UP (ref 3.5–5.3)
PROT SERPL-MCNC: 7.2 G/DL — SIGNIFICANT CHANGE UP (ref 6–8.3)
RBC # BLD: 4.77 M/UL — SIGNIFICANT CHANGE UP (ref 3.8–5.2)
RBC # FLD: 22 % — HIGH (ref 10.3–14.5)
RBC BLD AUTO: ABNORMAL
SODIUM SERPL-SCNC: 136 MMOL/L — SIGNIFICANT CHANGE UP (ref 135–145)
WBC # BLD: 12.18 K/UL — HIGH (ref 3.8–10.5)
WBC # FLD AUTO: 12.18 K/UL — HIGH (ref 3.8–10.5)

## 2023-07-07 PROCEDURE — 70450 CT HEAD/BRAIN W/O DYE: CPT | Mod: 26,MA

## 2023-07-07 PROCEDURE — 72131 CT LUMBAR SPINE W/O DYE: CPT | Mod: 26,MD

## 2023-07-07 PROCEDURE — 72125 CT NECK SPINE W/O DYE: CPT | Mod: 26,MD

## 2023-07-07 PROCEDURE — 72128 CT CHEST SPINE W/O DYE: CPT | Mod: 26,MD

## 2023-07-07 PROCEDURE — 99254 IP/OBS CNSLTJ NEW/EST MOD 60: CPT | Mod: GC

## 2023-07-07 PROCEDURE — 99285 EMERGENCY DEPT VISIT HI MDM: CPT

## 2023-07-07 PROCEDURE — 83020 HEMOGLOBIN ELECTROPHORESIS: CPT | Mod: 26

## 2023-07-07 RX ORDER — MORPHINE SULFATE 50 MG/1
4 CAPSULE, EXTENDED RELEASE ORAL ONCE
Refills: 0 | Status: DISCONTINUED | OUTPATIENT
Start: 2023-07-07 | End: 2023-07-07

## 2023-07-07 RX ORDER — ENOXAPARIN SODIUM 100 MG/ML
40 INJECTION SUBCUTANEOUS EVERY 24 HOURS
Refills: 0 | Status: DISCONTINUED | OUTPATIENT
Start: 2023-07-07 | End: 2023-07-16

## 2023-07-07 RX ORDER — LIDOCAINE 4 G/100G
1 CREAM TOPICAL ONCE
Refills: 0 | Status: COMPLETED | OUTPATIENT
Start: 2023-07-07 | End: 2023-07-07

## 2023-07-07 RX ORDER — KETOROLAC TROMETHAMINE 30 MG/ML
15 SYRINGE (ML) INJECTION ONCE
Refills: 0 | Status: DISCONTINUED | OUTPATIENT
Start: 2023-07-07 | End: 2023-07-07

## 2023-07-07 RX ORDER — ONDANSETRON 8 MG/1
4 TABLET, FILM COATED ORAL ONCE
Refills: 0 | Status: COMPLETED | OUTPATIENT
Start: 2023-07-07 | End: 2023-07-07

## 2023-07-07 RX ORDER — CYCLOBENZAPRINE HYDROCHLORIDE 10 MG/1
10 TABLET, FILM COATED ORAL THREE TIMES A DAY
Refills: 0 | Status: DISCONTINUED | OUTPATIENT
Start: 2023-07-07 | End: 2023-07-16

## 2023-07-07 RX ORDER — PANTOPRAZOLE SODIUM 20 MG/1
40 TABLET, DELAYED RELEASE ORAL
Refills: 0 | Status: DISCONTINUED | OUTPATIENT
Start: 2023-07-07 | End: 2023-07-16

## 2023-07-07 RX ORDER — ACETAMINOPHEN 500 MG
975 TABLET ORAL ONCE
Refills: 0 | Status: COMPLETED | OUTPATIENT
Start: 2023-07-07 | End: 2023-07-07

## 2023-07-07 RX ORDER — FERROUS SULFATE 325(65) MG
35 TABLET ORAL
Qty: 0 | Refills: 0 | DISCHARGE

## 2023-07-07 RX ORDER — ACETAMINOPHEN 500 MG
650 TABLET ORAL EVERY 6 HOURS
Refills: 0 | Status: DISCONTINUED | OUTPATIENT
Start: 2023-07-07 | End: 2023-07-16

## 2023-07-07 RX ADMIN — Medication 15 MILLIGRAM(S): at 17:20

## 2023-07-07 RX ADMIN — CYCLOBENZAPRINE HYDROCHLORIDE 10 MILLIGRAM(S): 10 TABLET, FILM COATED ORAL at 21:20

## 2023-07-07 RX ADMIN — LIDOCAINE 1 PATCH: 4 CREAM TOPICAL at 15:00

## 2023-07-07 RX ADMIN — Medication 650 MILLIGRAM(S): at 21:19

## 2023-07-07 RX ADMIN — ONDANSETRON 4 MILLIGRAM(S): 8 TABLET, FILM COATED ORAL at 14:58

## 2023-07-07 RX ADMIN — MORPHINE SULFATE 4 MILLIGRAM(S): 50 CAPSULE, EXTENDED RELEASE ORAL at 14:45

## 2023-07-07 RX ADMIN — Medication 500 MILLIGRAM(S): at 21:20

## 2023-07-07 RX ADMIN — Medication 975 MILLIGRAM(S): at 14:58

## 2023-07-07 RX ADMIN — LIDOCAINE 1 PATCH: 4 CREAM TOPICAL at 19:42

## 2023-07-07 NOTE — H&P ADULT - NSHPSOCIALHISTORY_GEN_ALL_CORE
Social History:    Marital Status:  (   )    ( x  ) Single    (   )    (  )   Occupation:   Lives with: (  ) alone  ( x ) children   (  ) spouse   (  ) parents  (  ) other    Substance Use (street drugs): ( x ) never used  (  ) other:  Tobacco Usage:  ( x  ) never smoked   (   ) former smoker   (   ) current smoker  (     ) pack years  (        ) last cigarette date  Alcohol Usage: no    (     ) Advanced Directives: (     ) None    (      ) DNR    (     ) DNI    (     ) Health Care Proxy:

## 2023-07-07 NOTE — ED PROVIDER NOTE - PHYSICAL EXAMINATION
Gen: WDWN, NAD  HEENT: EOMI, no nasal discharge, mucous membranes moist  CV: RRR, +S1/S2, no M/R/G  Resp: CTAB, no W/R/R  GI: Abdomen soft non-distended, NTTP, no masses  MSK: No open wounds, no bruising, no LE edema;  Paraspinal tenderness greater than midline thoracic and lumbar back tenderness.  Neuro: CN2-12 grossly intact, A&Ox4, MS +5/5 in UE and LE BL, finger to nose smooth and rapid, SILT intact in UE and LE BL, gait smooth and coordinated, negative rhomberg, negative pronator drift,  Psych: appropriate mood, denies AH, VH, SI    Rectal tone intact

## 2023-07-07 NOTE — ED ADULT NURSE NOTE - OBJECTIVE STATEMENT
38Y female, PMH remote anemia ( has received iron transfusions here in the past). presents to the ED c/o lower back pain / left arm numbness x1 week. denies any trauma to the back. states her pain is in her left lower back.  Previously ambulated without difficulty now needs a wheelchair to navigate at home.  Was previously seen at [facility] earlier in the week and was prescribed methocarbamol, ibuprofen, oxycodone, cyclobenzaprine without any relief of pain. denies f/n/v/d, headache, vision changes, cp, sob, abd pain. 20g iv placed in right AC.

## 2023-07-07 NOTE — ED PROVIDER NOTE - ATTENDING CONTRIBUTION TO CARE
attending Shadi: 38yF p/w atraumatic back pain x 1 week with inability to walk 2/2 pain. Seen at OSH for same with xrays performed, no relief with multiple prescriptions provided to her at that time. Exam as above. Differential diagnosis including, but not limited to, sciatica/MSK back pain, less likely acute cord compression as patient without focal neurologic deficit or bowel/bladder dysfunction. Will obtain labs, pain control, start with CT imaging of spine, reassess

## 2023-07-07 NOTE — H&P ADULT - NSHPREVIEWOFSYSTEMS_GEN_ALL_CORE
REVIEW OF SYSTEMS:    CONSTITUTIONAL: No weakness, fevers or chills  EYES/ENT: No visual changes;  No vertigo or throat pain    NECK: No pain or stiffness   RESPIRATORY: No cough, wheezing, hemoptysis; No shortness of breath   CARDIOVASCULAR: No chest pain or palpitations  GASTROINTESTINAL: No abdominal or epigastric pain. No nausea, vomiting, or hematemesis; No diarrhea or constipation. No melena or hematochezia.  GENITOURINARY: No dysuria, frequency or hematuria  NEUROLOGICAL: No numbness or weakness + back pain L leg pain  SKIN: No itching, burning, rashes, or lesions   All other review of systems is negative unless indicated above.

## 2023-07-07 NOTE — ED PROVIDER NOTE - PROGRESS NOTE DETAILS
Corinne Stahl MD; [facility] PGY3: CT results discussed with patient, reassessed after pain meds patient still feeling pain and unable to walk.  Discussed admission with patient, patient amenable to admission for MRIs, PT, pain control further.  Will admit to Dr. Jefferson for further management as patient does not have a PCP.

## 2023-07-07 NOTE — ED PROVIDER NOTE - NS ED ROS FT
Gen: No fever, normal appetite  Eyes: No eye irritation or discharge  ENT: No ear pain, congestion, sore throat  Resp: No cough or trouble breathing  Cardiovascular: No chest pain or palpitation  Gastroenteric: No nausea/vomiting, diarrhea, constipation  :  No change in urine output; no dysuria  MS: (+) paraspinal back pain  Skin: No rashes  Neuro: No headache; no abnormal movements  Remainder negative, except as per the HPI

## 2023-07-07 NOTE — CONSULT NOTE ADULT - ATTENDING COMMENTS
left lower extremity pain  pos damian sign, neg SLRT  Left hip pain\\Recommend MRI LS spine and left hp

## 2023-07-07 NOTE — H&P ADULT - NSHPPHYSICALEXAM_GEN_ALL_CORE
PHYSICAL EXAMINATION:  Vital Signs Last 24 Hrs  T(C): 36.4 (07 Jul 2023 17:30), Max: 37.5 (07 Jul 2023 13:07)  T(F): 97.6 (07 Jul 2023 17:30), Max: 99.5 (07 Jul 2023 13:07)  HR: 80 (07 Jul 2023 17:30) (80 - 113)  BP: 116/60 (07 Jul 2023 17:30) (116/60 - 144/99)  BP(mean): --  RR: 17 (07 Jul 2023 17:30) (15 - 20)  SpO2: 96% (07 Jul 2023 17:30) (95% - 99%)    Parameters below as of 07 Jul 2023 17:30  Patient On (Oxygen Delivery Method): room air      CAPILLARY BLOOD GLUCOSE          GENERAL: NAD   HEAD:  atraumatic, normocephalic  EYES: sclera anicteric  ENMT: mucous membranes moist  NECK: supple, No JVD  CHEST/LUNG: clear to auscultation bilaterally; no rales, rhonchi, or wheezing b/l  HEART: normal S1, S2  ABDOMEN: BS+, soft, ND, NT   EXTREMITIES:  pulses palpable; no clubbing, cyanosis, or edema b/l LEs  NEURO: awake, alert, interactive; moves all extremities + back pain + L straight leg raising  SKIN: no rashes or lesions

## 2023-07-07 NOTE — ED PROVIDER NOTE - OBJECTIVE STATEMENT
38-year-old female past medical history of remote anemia previously receiving iron transfusions here for lower back pain with some associated left arm numbness x1 week.  Patient denies any trauma to the back, injection prior to the pain, fever, chest pain, abdominal pain, nausea, vomiting, shortness of breath, bowel or bladder incontinence, saddle anesthesia.  Patient cannot recall inciting event for pain but states that pain is primarily in the lower back paraspinal greater than midline radiating down the legs and making it difficult for her to move and walk.  Previously ambulated without difficulty now needs a wheelchair to navigate at home.  Was previously seen at [facility] earlier in the week and was prescribed methocarbamol, ibuprofen, oxycodone, cyclobenzaprine without any relief of pain.

## 2023-07-07 NOTE — PATIENT PROFILE ADULT - FALL HARM RISK - HARM RISK INTERVENTIONS

## 2023-07-07 NOTE — ED ADULT NURSE NOTE - NSFALLRISKINTERV_ED_ALL_ED

## 2023-07-07 NOTE — H&P ADULT - NSHPLABSRESULTS_GEN_ALL_CORE
7.2    12.18 )-----------( 650      ( 07 Jul 2023 14:44 )             26.9       07-07    136  |  99  |  8   ----------------------------<  88  4.7   |  25  |  0.58    Ca    8.8      07 Jul 2023 14:44    TPro  7.2  /  Alb  3.5  /  TBili  0.5  /  DBili  x   /  AST  56<H>  /  ALT  16  /  AlkPhos  72  07-07              Urinalysis Basic - ( 07 Jul 2023 14:44 )    Color: x / Appearance: x / SG: x / pH: x  Gluc: 88 mg/dL / Ketone: x  / Bili: x / Urobili: x   Blood: x / Protein: x / Nitrite: x   Leuk Esterase: x / RBC: x / WBC x   Sq Epi: x / Non Sq Epi: x / Bacteria: x      < from: CT Cervical Spine No Cont (07.07.23 @ 16:30) >      IMPRESSION:    CT cervical spine: No vertebral fracture is recognized.  Moderate   degenerative disc disease and spondylosis at C3-4 through C6-7 with loss   of disc height and associated degenerative endplate changes. There is   narrowing of the LEFT C5-6 neural foramen due to uncovertebral spurring.    CT thoracic and lumbar spine:  Mild disc degeneration and spondylosis.   Mild disc bulges are noted at L2-3, L3-4 and L4-5 which flatten the   ventral thecal sac and narrow the BILATERAL neural foramina. Superimposed   LEFT paracentral disc herniation at L4-5 compresses the ventral thecal   sac and the descending LEFT L5 nerve root.   No acute vertebral fracture   is recognized.    < end of copied text >

## 2023-07-07 NOTE — H&P ADULT - ASSESSMENT
38 f with    Back pain  - MR LS  - NSAID  - Flexeril  - PT  - Neurology evaluation    L arm and L leg numbness  - CT head    Anemia  - iron studies, B12    DVT prophylaxis    Further action as per clinical course     Davion Jefferson MD phone [phone]

## 2023-07-07 NOTE — PATIENT PROFILE ADULT - FUNCTIONAL ASSESSMENT - BASIC MOBILITY 6.
2-calculated by average/Not able to assess (calculate score using [facility] averaging method) 4 = No assist / stand by assistance

## 2023-07-07 NOTE — ED PROVIDER NOTE - CLINICAL SUMMARY MEDICAL DECISION MAKING FREE TEXT BOX
38F PMH as above here for atraumatic back pain x 1 week with inability to walk 2/2 pain.  Vital signs significant for tachycardia likely secondary to pain, otherwise stable.  Physical exam significant for paraspinal greater than midline lumbar and thoracic spinal tenderness.  Differential includes but is not limited to sciatica given positive straight leg test and paraspinal tenderness however cannot rule out acute compression fracture (abnormal given atraumatic and patient without any risk factors known osteoporosis, hypocalcemia).  Given extent of pain and inability to walk we will do spinal CT with cervical spinal CT included given left arm numbness, pain control with Toradol and morphine and lidocaine patch and likely admit for MRI given that patient cannot walk so was not a candidate for CDU. 38F PMH as above here for atraumatic back pain x 1 week with inability to walk 2/2 pain.  Vital signs significant for tachycardia likely secondary to pain, otherwise stable.  Physical exam significant for paraspinal greater than midline lumbar and thoracic spinal tenderness.  Differential includes but is not limited to sciatica given positive straight leg test and paraspinal tenderness however cannot rule out acute compression fracture (abnormal given atraumatic and patient without any risk factors known osteoporosis, hypocalcemia).  Given extent of pain and inability to walk we will do spinal CT with cervical spinal CT included given left arm numbness, pain control with Toradol and morphine and lidocaine patch and likely admit for MRI given that patient cannot walk so is not a candidate for CDU.

## 2023-07-07 NOTE — H&P ADULT - HISTORY OF PRESENT ILLNESS
38-year-old female past medical history of remote anemia previously receiving iron transfusions here for lower back pain with some associated left arm numbness x1 week.  Patient denies any trauma to the back, injection prior to the pain, fever, chest pain, abdominal pain, nausea, vomiting, shortness of breath, bowel or bladder incontinence, saddle anesthesia.  Patient cannot recall inciting event for pain but states that pain is primarily in the lower back paraspinal greater than midline radiating down the legs and making it difficult for her to move and walk.  Previously ambulated without difficulty now needs a wheelchair to navigate at home.  Was previously seen at [facility] earlier in the week and was prescribed methocarbamol, ibuprofen, oxycodone, cyclobenzaprine without any relief of pain.

## 2023-07-08 LAB
ERYTHROCYTE [SEDIMENTATION RATE] IN BLOOD: 87 MM/HR — HIGH (ref 0–15)
FERRITIN SERPL-MCNC: 29 NG/ML — SIGNIFICANT CHANGE UP (ref 15–150)
IRON SATN MFR SERPL: 14 UG/DL — LOW (ref 30–160)
IRON SATN MFR SERPL: 3 % — LOW (ref 14–50)
TIBC SERPL-MCNC: 441 UG/DL — HIGH (ref 220–430)
UIBC SERPL-MCNC: 427 UG/DL — HIGH (ref 110–370)
VIT B12 SERPL-MCNC: 365 PG/ML — SIGNIFICANT CHANGE UP (ref 232–1245)

## 2023-07-08 RX ADMIN — LIDOCAINE 1 PATCH: 4 CREAM TOPICAL at 03:00

## 2023-07-08 RX ADMIN — PANTOPRAZOLE SODIUM 40 MILLIGRAM(S): 20 TABLET, DELAYED RELEASE ORAL at 05:49

## 2023-07-08 RX ADMIN — Medication 500 MILLIGRAM(S): at 06:19

## 2023-07-08 RX ADMIN — ENOXAPARIN SODIUM 40 MILLIGRAM(S): 100 INJECTION SUBCUTANEOUS at 05:50

## 2023-07-08 RX ADMIN — Medication 500 MILLIGRAM(S): at 05:49

## 2023-07-08 RX ADMIN — Medication 500 MILLIGRAM(S): at 18:14

## 2023-07-08 RX ADMIN — Medication 500 MILLIGRAM(S): at 18:10

## 2023-07-08 NOTE — PROGRESS NOTE ADULT - SUBJECTIVE AND OBJECTIVE BOX
Patient is a 38y old  Female who presents with a chief complaint of     SUBJECTIVE / OVERNIGHT EVENTS: feels better.  Review of Systems  chest pain no  palpitations no  sob no  nausea no  headache no    MEDICATIONS  (STANDING):  enoxaparin Injectable 40 milliGRAM(s) SubCutaneous every 24 hours  naproxen 500 milliGRAM(s) Oral two times a day  pantoprazole    Tablet 40 milliGRAM(s) Oral before breakfast    MEDICATIONS  (PRN):  acetaminophen     Tablet .. 650 milliGRAM(s) Oral every 6 hours PRN Temp greater or equal to 38.5C (101.3F), Mild Pain (1 - 3)  cyclobenzaprine 10 milliGRAM(s) Oral three times a day PRN Muscle Spasm      Vital Signs Last 24 Hrs  T(C): 36.7 (08 Jul 2023 04:44), Max: 37.3 (07 Jul 2023 20:20)  T(F): 98.1 (08 Jul 2023 04:44), Max: 99.2 (07 Jul 2023 20:20)  HR: 70 (08 Jul 2023 04:44) (70 - 94)  BP: 109/76 (08 Jul 2023 04:44) (100/65 - 128/77)  BP(mean): --  RR: 18 (08 Jul 2023 04:44) (15 - 19)  SpO2: 96% (08 Jul 2023 04:44) (95% - 100%)    Parameters below as of 08 Jul 2023 04:44  Patient On (Oxygen Delivery Method): room air        PHYSICAL EXAM:  GENERAL: NAD, well-developed  HEAD:  Atraumatic, Normocephalic  EYES: EOMI, PERRLA, conjunctiva and sclera clear  NECK: Supple, No JVD  CHEST/LUNG: Clear to auscultation bilaterally; No wheeze  HEART: Regular rate and rhythm; No murmurs, rubs, or gallops  ABDOMEN: Soft, Nontender, Nondistended; Bowel sounds present  EXTREMITIES:  + L leg raising  PSYCH: AAOx3  NEUROLOGY: non-focal  SKIN: No rashes or lesions    LABS:                        7.2    12.18 )-----------( 650      ( 07 Jul 2023 14:44 )             26.9     07-07    136  |  99  |  8   ----------------------------<  88  4.7   |  25  |  0.58    Ca    8.8      07 Jul 2023 14:44    TPro  7.2  /  Alb  3.5  /  TBili  0.5  /  DBili  x   /  AST  56<H>  /  ALT  16  /  AlkPhos  72  07-07          Urinalysis Basic - ( 07 Jul 2023 14:44 )    Color: x / Appearance: x / SG: x / pH: x  Gluc: 88 mg/dL / Ketone: x  / Bili: x / Urobili: x   Blood: x / Protein: x / Nitrite: x   Leuk Esterase: x / RBC: x / WBC x   Sq Epi: x / Non Sq Epi: x / Bacteria: x          RADIOLOGY & ADDITIONAL TESTS:    Imaging Personally Reviewed:  < from: CT Head No Cont (07.07.23 @ 19:00) >  IMPRESSION:  No acute intracranial bleeding, mass effect, or shift.    < end of copied text >    Consultant(s) Notes Reviewed:      Care Discussed with Consultants/Other Providers:

## 2023-07-08 NOTE — PHYSICAL THERAPY INITIAL EVALUATION ADULT - IMPAIRMENTS FOUND, PT EVAL
Purpose: Staff provided individual therapy in order to address the impact of trauma on Ryan's life. Ryan met with staff in person at the [facility]. Ryan was dressed appropriately and reported feeling a little better. Ryan reported excitement about finding new housing and a new job. Ryan stated that he continues to experience anxiety and panic attacks.      Intervention: Staff reviewed coping skills and resources for panic attacks. Staff provided instruction/rpractice with progressive muscle relaxation and visualization techniques. Staff provided distress tolerance strategies.       Mental status:    Pt's attitude was engaged, depressed mood, w/ congruent affect.  Pt was oriented x4.  Concentration appeared intact.  Immediate, recent, and remote memory was intact.  Speech was soft. Thoughts appeared logical.  Pt denied SI, HI, and psychosis.  Behavior was observed to be within normal limits.  Judgment good w/ good insight.        Plan: Ryan will be staffed new therapist when staff leaves [facility]. Ryan will connect with  provider for assistance with employment and housing.       gait, locomotion, and balance/muscle strength/sensory integrity

## 2023-07-08 NOTE — PHYSICAL THERAPY INITIAL EVALUATION ADULT - BALANCE TRAINING, PT EVAL
GOAL: Patient will demonstrate an increase in static/dynamic balance in sitting/standing where deficient by at least 1 grade to facilitate greater independence during functional mobility and ADL's. in 2 weeks

## 2023-07-08 NOTE — PROGRESS NOTE ADULT - ASSESSMENT
38 f with    Back pain  - MR LS  - NSAID  - Flexeril  - PT  - Neurology evaluation    L arm and L leg numbness  - CT head noted    Anemia  - iron studies, B12 pending     DVT prophylaxis    Davion Jefferson MD phone [phone]

## 2023-07-08 NOTE — PHYSICAL THERAPY INITIAL EVALUATION ADULT - GAIT TRAINING, PT EVAL
GOAL: pt will be able to ambulate 150 feet independently with appropriate assistive device in 2 weeks

## 2023-07-08 NOTE — PHYSICAL THERAPY INITIAL EVALUATION ADULT - PERTINENT HX OF CURRENT PROBLEM, REHAB EVAL
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  Moderate degenerative disc disease and spondylosis at C3-4 through C6-7 with loss of disc height and associated degenerative endplate changes. There is narrowing of the LEFT C5-6 neural foramen due to uncovertebral spurring.  CT thoracic and lumbar spine:  Mild disc degeneration and spondylosis. Mild disc bulges are noted at L2-3, L3-4 and L4-5 which flatten the ventral thecal sac and narrow the BILATERAL neural foramina. Superimposed LEFT paracentral disc herniation at L4-5 compresses the ventral thecal   sac and the descending LEFT L5 nerve root.   No acute vertebral fracture   is recognized. Ortho recommending MRI C/LSp to further delineate pathology, test still pending. Patient is a 38 year old Female adm to [facility] 7/7/23 w/ Cervical, Thoracic, Lumbar DDD, L4-5 paracent HNP, Central HNP L2-5   CT cervical spine: No vertebral fracture is recognized.  Moderate degenerative disc disease and spondylosis at C3-4 through C6-7 with loss of disc height and associated degenerative endplate changes. There is narrowing of the LEFT C5-6 neural foramen due to uncovertebral spurring.  CT thoracic and lumbar spine:  Mild disc degeneration and spondylosis. Mild disc bulges are noted at L2-3, L3-4 and L4-5 which flatten the ventral thecal sac and narrow the BILATERAL neural foramina. Superimposed LEFT paracentral disc herniation at L4-5 compresses the ventral thecal   sac and the descending LEFT L5 nerve root.   No acute vertebral fracture   is recognized. Ortho recommending MRI C/LSp to further delineate pathology, test still pending for today.

## 2023-07-08 NOTE — PROGRESS NOTE ADULT - SUBJECTIVE AND OBJECTIVE BOX
Patient seen and examined at bedside this AM. Reports symptoms unchanged, painful to lay on left side which exacerbated "pain down arm/leg". No acute complaints at this time. Pain well controlled. Denies chest pain, shortness of breath, nausea or vomiting.     PE:  Vital Signs Last 24 Hrs  T(C): 36.8 (07-08-23 @ 00:45), Max: 37.5 (07-07-23 @ 13:07)  T(F): 98.3 (07-08-23 @ 00:45), Max: 99.5 (07-07-23 @ 13:07)  HR: 84 (07-08-23 @ 00:45) (80 - 113)  BP: 100/65 (07-08-23 @ 00:45) (100/65 - 144/99)  BP(mean): --  RR: 18 (07-08-23 @ 00:45) (15 - 20)  SpO2: 97% (07-08-23 @ 00:45) (95% - 100%)    General: NAD, resting comfortably in bed                            7.2    12.18 )-----------( 650      ( 07 Jul 2023 14:44 )             26.9     07-07    136  |  99  |  8   ----------------------------<  88  4.7   |  25  |  0.58    Ca    8.8      07 Jul 2023 14:44    TPro  7.2  /  Alb  3.5  /  TBili  0.5  /  DBili  x   /  AST  56<H>  /  ALT  16  /  AlkPhos  72  07-07          SPINE:  Skin intact  NTTP over the bony prominences of the cervical / thoracic / lumbar / sacral spine  + Paraspinal TTP of the lumbar / spine  No Paraspinal TTP of the cervical / thoracic / sacral  No bony step-offs  Grossly moving all extremities  + Radial Pulse  + DP/PT Pulses  No saddle anesthesia  +  rectal tone      MOTOR EXAM:                          Elbow Flex (C5)     Wrist Ext (C6)     Elbow Ext (C7)      Finger Flex (C8)    Finger Abduction (T1)  RIGHT                 5/5                         5/5                         5/5                          5/5                              5/5  LEFT                    5/5                         5/5                         5/5                          5/5                              5/5                           Hip Flex (L2)      Knee Ext (L3)      Ank Dorsiflex (L4)     Hallux Ext (L5)     Ank PlantarFlex (S1)  RIGHT               5/5                      5/5                          5/5                            5/5                           5/5  LEFT                  4/5                      5/5                          5/5                            5/5                           5/5    **LE exam limited 2/2 pain, patient reports no subjective weakness    SENSORY EXAM:                        C5      C6      C7      C8       T1          RIGHT          2         2        2         2         2          (0=absent, 1=impaired, 2=normal, NT=not testable)  LEFT             2         2        2         2         2          (0=absent, 1=impaired, 2=normal, NT=not testable)                        L2        L3       L4      L5       S1          RIGHT        2          2         2        2        2           (0=absent, 1=impaired, 2=normal, NT=not testable)  LEFT           2          2         1        1        2           (0=absent, 1=impaired, 2=normal, NT=not testable)    Negative Diop's sign bilaterally  Negative Babinski bilaterally   Negative Myoclonus bilaterally  Negative  and Release Test  Negative Spurling's Test          A/P :   Patient is a 38yFemale w/ Cervical, Thoracic, Lumbar DDD, 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 Does not demonstrate red flag symptoms such as bowel/bladder incontinence, saddle anesthesia, fevers/chills, or weight loss  -Patient was extensively educated about the signs and symptoms of osteomyelitis, epidural abscess, discitis, acute cord compression. The patient was advised to inform provider should she develop neurological symptoms such as new onset acute weakness, numbness/tingling/paresthesias, worsening pain, bowel/bladder incontinence, or fevers/chills.  -Recommend weight loss/core strengthening for improved back health  -No heavy lifting/twisting  -Multimodal analgesia - recommend low dose opioids, acetaminophen, muscle relaxant as tolerated  -Plan for MRI C/LSp to further delineate pathology  -Neuro c/s appreciated, fu imaging rec'd  -CRP 91  -All patient's questions answered. Patient understands and agrees w/ above plan.  -Would hold steroid until MRI completion, likely WBC elevated 2/2 course of outpatient prednisone; However CRP 91  -Further plan pending MRI        Patient seen and examined at bedside this AM. Reports symptoms unchanged, painful to lay on left side which exacerbated "pain down arm/leg". No acute complaints at this time. Pain well controlled. Denies chest pain, shortness of breath, nausea or vomiting.     PE:  Vital Signs Last 24 Hrs  T(C): 36.8 (07-08-23 @ 00:45), Max: 37.5 (07-07-23 @ 13:07)  T(F): 98.3 (07-08-23 @ 00:45), Max: 99.5 (07-07-23 @ 13:07)  HR: 84 (07-08-23 @ 00:45) (80 - 113)  BP: 100/65 (07-08-23 @ 00:45) (100/65 - 144/99)  BP(mean): --  RR: 18 (07-08-23 @ 00:45) (15 - 20)  SpO2: 97% (07-08-23 @ 00:45) (95% - 100%)    General: NAD, resting comfortably in bed                            7.2    12.18 )-----------( 650      ( 07 Jul 2023 14:44 )             26.9     07-07    136  |  99  |  8   ----------------------------<  88  4.7   |  25  |  0.58    Ca    8.8      07 Jul 2023 14:44    TPro  7.2  /  Alb  3.5  /  TBili  0.5  /  DBili  x   /  AST  56<H>  /  ALT  16  /  AlkPhos  72  07-07          SPINE:  Skin intact  NTTP over the bony prominences of the cervical / thoracic / lumbar / sacral spine  + Paraspinal TTP of the lumbar / spine  No Paraspinal TTP of the cervical / thoracic / sacral  No bony step-offs  Grossly moving all extremities  + Radial Pulse  + DP/PT Pulses  No saddle anesthesia  +  rectal tone      MOTOR EXAM:                          Elbow Flex (C5)     Wrist Ext (C6)     Elbow Ext (C7)      Finger Flex (C8)    Finger Abduction (T1)  RIGHT                 5/5                         5/5                         5/5                          5/5                              5/5  LEFT                    5/5                         5/5                         5/5                          5/5                              5/5                           Hip Flex (L2)      Knee Ext (L3)      Ank Dorsiflex (L4)     Hallux Ext (L5)     Ank PlantarFlex (S1)  RIGHT               5/5                      5/5                          5/5                            5/5                           5/5  LEFT                  4/5                      5/5                          5/5                            5/5                           5/5    **LE exam limited 2/2 pain, patient reports no subjective weakness    SENSORY EXAM:                        C5      C6      C7      C8       T1          RIGHT          2         2        2         2         2          (0=absent, 1=impaired, 2=normal, NT=not testable)  LEFT             2         2        2         2         2          (0=absent, 1=impaired, 2=normal, NT=not testable)                        L2        L3       L4      L5       S1          RIGHT        2          2         2        2        2           (0=absent, 1=impaired, 2=normal, NT=not testable)  LEFT           2          2         1        1        2           (0=absent, 1=impaired, 2=normal, NT=not testable)    Negative Diop's sign bilaterally  Negative Babinski bilaterally   Negative Myoclonus bilaterally  Negative  and Release Test  Negative Spurling's Test          A/P :   Patient is a 38yFemale w/ Cervical, Thoracic, Lumbar DDD, 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 Does not demonstrate red flag symptoms such as bowel/bladder incontinence, saddle anesthesia, fevers/chills, or weight loss  -Patient was extensively educated about the signs and symptoms of osteomyelitis, epidural abscess, discitis, acute cord compression. The patient was advised to inform provider should she develop neurological symptoms such as new onset acute weakness, numbness/tingling/paresthesias, worsening pain, bowel/bladder incontinence, or fevers/chills.  -Recommend weight loss/core strengthening for improved back health  -No heavy lifting/twisting  -Multimodal analgesia - recommend low dose opioids, acetaminophen, muscle relaxant as tolerated  -Plan for MRI C/LSp to further delineate pathology;   -Neuro c/s appreciated, fu imaging rec'd  -CRP 91  -All patient's questions answered. Patient understands and agrees w/ above plan.  -Would hold steroid until MRI completion, likely WBC elevated 2/2 course of outpatient prednisone; However CRP 91, steroids started [facility] 1 week prior during onset of symptoms per patient; If concern for abscess arises, would need C/T/LSp w/ and w/o   -Further plan pending MRI

## 2023-07-08 NOTE — PHYSICAL THERAPY INITIAL EVALUATION ADULT - ADDITIONAL COMMENTS
Pt lives in private house with her 4 children (age 7, 10, 13, 15) with 4 steps to enter with bilateral handrails, and 12 steps to bedroom with 1 handrail.  Pt was previously independent in all areas of mobility.  For the last week, pt experienced increased back pain and was mobilizing in her house with a wheeled computer chair and crawling up and down the stairs.

## 2023-07-09 PROCEDURE — 72141 MRI NECK SPINE W/O DYE: CPT | Mod: 26

## 2023-07-09 PROCEDURE — 72148 MRI LUMBAR SPINE W/O DYE: CPT | Mod: 26

## 2023-07-09 PROCEDURE — 73501 X-RAY EXAM HIP UNI 1 VIEW: CPT | Mod: 26,LT

## 2023-07-09 RX ORDER — FERROUS SULFATE 325(65) MG
325 TABLET ORAL DAILY
Refills: 0 | Status: DISCONTINUED | OUTPATIENT
Start: 2023-07-09 | End: 2023-07-11

## 2023-07-09 RX ADMIN — Medication 500 MILLIGRAM(S): at 05:37

## 2023-07-09 RX ADMIN — Medication 500 MILLIGRAM(S): at 17:01

## 2023-07-09 RX ADMIN — Medication 650 MILLIGRAM(S): at 16:33

## 2023-07-09 RX ADMIN — Medication 500 MILLIGRAM(S): at 17:31

## 2023-07-09 RX ADMIN — Medication 650 MILLIGRAM(S): at 16:03

## 2023-07-09 RX ADMIN — PANTOPRAZOLE SODIUM 40 MILLIGRAM(S): 20 TABLET, DELAYED RELEASE ORAL at 05:37

## 2023-07-09 RX ADMIN — Medication 500 MILLIGRAM(S): at 06:29

## 2023-07-09 RX ADMIN — Medication 325 MILLIGRAM(S): at 08:52

## 2023-07-09 RX ADMIN — ENOXAPARIN SODIUM 40 MILLIGRAM(S): 100 INJECTION SUBCUTANEOUS at 05:37

## 2023-07-09 NOTE — PROGRESS NOTE ADULT - SUBJECTIVE AND OBJECTIVE BOX
Patient seen and examined at bedside. Patient reports pain moderately controlled on medications. No acute events overnight. Pt denies fevers, chills, new onset numbness, weakness or tingling in the extremities.    T(C): 37.9 (07-08-23 @ 21:11), Max: 37.9 (07-08-23 @ 21:11)  T(F): 100.2 (07-08-23 @ 21:11), Max: 100.2 (07-08-23 @ 21:11)  HR: 95 (07-08-23 @ 21:11) (89 - 98)  BP: 118/75 (07-08-23 @ 21:11) (108/68 - 118/75)  RR: 18 (07-08-23 @ 21:11) (18 - 18)  SpO2: 98% (07-08-23 @ 21:11) (98% - 100%)      SPINE:  Skin intact  NTTP over the bony prominences of the cervical / thoracic / lumbar / sacral spine  + Paraspinal TTP of the lumbar / spine  No Paraspinal TTP of the cervical / thoracic / sacral  No bony step-offs  Grossly moving all extremities  + Radial Pulse  + DP/PT Pulses  No saddle anesthesia  +  rectal tone      MOTOR EXAM:                          Elbow Flex (C5)     Wrist Ext (C6)     Elbow Ext (C7)      Finger Flex (C8)    Finger Abduction (T1)  RIGHT                 5/5                         5/5                         5/5                          5/5                              5/5  LEFT                    5/5                         5/5                         5/5                          5/5                              5/5                           Hip Flex (L2)      Knee Ext (L3)      Ank Dorsiflex (L4)     Hallux Ext (L5)     Ank PlantarFlex (S1)  RIGHT               5/5                      5/5                          5/5                            5/5                           5/5  LEFT                  4/5                      5/5                          5/5                            5/5                           5/5    **LE exam limited 2/2 pain, patient reports no subjective weakness    SENSORY EXAM:                        C5      C6      C7      C8       T1          RIGHT          2         2        2         2         2          (0=absent, 1=impaired, 2=normal, NT=not testable)  LEFT             2         2        2         2         2          (0=absent, 1=impaired, 2=normal, NT=not testable)                        L2        L3       L4      L5       S1          RIGHT        2          2         2        2        2           (0=absent, 1=impaired, 2=normal, NT=not testable)  LEFT           2          2         1        1        2           (0=absent, 1=impaired, 2=normal, NT=not testable)    Negative Diop's sign bilaterally  Negative Babinski bilaterally   Negative Myoclonus bilaterally  Negative  and Release Test  Negative Spurling's Test          A/P :   Patient is a 38yFemale w/ Cervical, Thoracic, Lumbar DDD, 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 Does not demonstrate red flag symptoms such as bowel/bladder incontinence, saddle anesthesia, fevers/chills, or weight loss  -Patient was extensively educated about the signs and symptoms of osteomyelitis, epidural abscess, discitis, acute cord compression. The patient was advised to inform provider should she develop neurological symptoms such as new onset acute weakness, numbness/tingling/paresthesias, worsening pain, bowel/bladder incontinence, or fevers/chills.  -Recommend weight loss/core strengthening for improved back health  -No heavy lifting/twisting  -Multimodal analgesia - recommend low dose opioids, acetaminophen, muscle relaxant as tolerated  -Plan for MRI C/LSp to further delineate pathology;   -Neuro c/s appreciated, fu imaging rec'd  -CRP 91  -All patient's questions answered. Patient understands and agrees w/ above plan.  -Would hold steroid until MRI completion, likely WBC elevated 2/2 course of outpatient prednisone; However CRP 91, steroids started [facility] 1 week prior during onset of symptoms per patient; If concern for abscess arises, would need C/T/LSp w/ and w/o   -Further plan pending MRI

## 2023-07-09 NOTE — PROGRESS NOTE ADULT - SUBJECTIVE AND OBJECTIVE BOX
Patient is a 38y old  Female who presents with a chief complaint of     SUBJECTIVE / OVERNIGHT EVENTS: feels better.   Review of Systems  chest pain no  palpitations no  sob no  nausea no  headache no    MEDICATIONS  (STANDING):  enoxaparin Injectable 40 milliGRAM(s) SubCutaneous every 24 hours  ferrous    sulfate 325 milliGRAM(s) Oral daily  naproxen 500 milliGRAM(s) Oral two times a day  pantoprazole    Tablet 40 milliGRAM(s) Oral before breakfast    MEDICATIONS  (PRN):  acetaminophen     Tablet .. 650 milliGRAM(s) Oral every 6 hours PRN Temp greater or equal to 38.5C (101.3F), Mild Pain (1 - 3)  cyclobenzaprine 10 milliGRAM(s) Oral three times a day PRN Muscle Spasm      Vital Signs Last 24 Hrs  T(C): 37.5 (09 Jul 2023 12:40), Max: 37.9 (08 Jul 2023 21:11)  T(F): 99.5 (09 Jul 2023 12:40), Max: 100.2 (08 Jul 2023 21:11)  HR: 86 (09 Jul 2023 12:40) (80 - 95)  BP: 118/76 (09 Jul 2023 12:40) (112/68 - 118/76)  BP(mean): --  RR: 18 (09 Jul 2023 12:40) (18 - 18)  SpO2: 97% (09 Jul 2023 12:40) (95% - 98%)    Parameters below as of 09 Jul 2023 12:40  Patient On (Oxygen Delivery Method): room air        PHYSICAL EXAM:  GENERAL: NAD, well-developed  HEAD:  Atraumatic, Normocephalic  EYES: EOMI, PERRLA, conjunctiva and sclera clear  NECK: Supple, No JVD  CHEST/LUNG: Clear to auscultation bilaterally; No wheeze  HEART: Regular rate and rhythm; No murmurs, rubs, or gallops  ABDOMEN: Soft, Nontender, Nondistended; Bowel sounds present  EXTREMITIES:  2+ Peripheral Pulses, No clubbing, cyanosis, or edema  PSYCH: AAOx3  NEUROLOGY: non-focal  SKIN: No rashes or lesions    LABS:                      RADIOLOGY & ADDITIONAL TESTS:    Imaging Personally Reviewed:    Consultant(s) Notes Reviewed:      Care Discussed with Consultants/Other Providers:

## 2023-07-09 NOTE — PROGRESS NOTE ADULT - ASSESSMENT
38 f with    Back pain  - MR LS pending   - NSAID  - Flexeril  - PT  - Neurology evaluation noted    L arm and L leg numbness  - CT head noted    Anemia iron deficiency  - iron studies noted  - start Iron  - need GI evaluation     DVT prophylaxis    Davion Jefferson MD phone [phone]

## 2023-07-10 PROCEDURE — 70549 MR ANGIOGRAPH NECK W/O&W/DYE: CPT | Mod: 26

## 2023-07-10 PROCEDURE — 70553 MRI BRAIN STEM W/O & W/DYE: CPT | Mod: 26

## 2023-07-10 PROCEDURE — 70544 MR ANGIOGRAPHY HEAD W/O DYE: CPT | Mod: 26,59

## 2023-07-10 RX ADMIN — Medication 500 MILLIGRAM(S): at 05:36

## 2023-07-10 RX ADMIN — ENOXAPARIN SODIUM 40 MILLIGRAM(S): 100 INJECTION SUBCUTANEOUS at 05:37

## 2023-07-10 RX ADMIN — Medication 500 MILLIGRAM(S): at 17:57

## 2023-07-10 RX ADMIN — CYCLOBENZAPRINE HYDROCHLORIDE 10 MILLIGRAM(S): 10 TABLET, FILM COATED ORAL at 02:27

## 2023-07-10 RX ADMIN — Medication 650 MILLIGRAM(S): at 14:04

## 2023-07-10 RX ADMIN — Medication 325 MILLIGRAM(S): at 11:27

## 2023-07-10 RX ADMIN — PANTOPRAZOLE SODIUM 40 MILLIGRAM(S): 20 TABLET, DELAYED RELEASE ORAL at 05:36

## 2023-07-10 RX ADMIN — Medication 650 MILLIGRAM(S): at 15:04

## 2023-07-10 RX ADMIN — Medication 650 MILLIGRAM(S): at 03:13

## 2023-07-10 RX ADMIN — Medication 500 MILLIGRAM(S): at 17:07

## 2023-07-10 RX ADMIN — Medication 500 MILLIGRAM(S): at 07:12

## 2023-07-10 RX ADMIN — Medication 650 MILLIGRAM(S): at 02:26

## 2023-07-10 NOTE — PROGRESS NOTE ADULT - SUBJECTIVE AND OBJECTIVE BOX
Patient is a 38y old  Female who presents with a chief complaint of     SUBJECTIVE / OVERNIGHT EVENTS: Comfortable without new complaints. Feels better. Ambulated to bathroom.  Review of Systems  chest pain no  palpitations no  sob no  nausea no  headache no    MEDICATIONS  (STANDING):  enoxaparin Injectable 40 milliGRAM(s) SubCutaneous every 24 hours  ferrous    sulfate 325 milliGRAM(s) Oral daily  naproxen 500 milliGRAM(s) Oral two times a day  pantoprazole    Tablet 40 milliGRAM(s) Oral before breakfast    MEDICATIONS  (PRN):  acetaminophen     Tablet .. 650 milliGRAM(s) Oral every 6 hours PRN Temp greater or equal to 38.5C (101.3F), Mild Pain (1 - 3)  cyclobenzaprine 10 milliGRAM(s) Oral three times a day PRN Muscle Spasm      Vital Signs Last 24 Hrs  T(C): 36.8 (10 Jul 2023 20:55), Max: 37.1 (10 Jul 2023 12:05)  T(F): 98.3 (10 Jul 2023 20:55), Max: 98.7 (10 Jul 2023 12:05)  HR: 84 (10 Jul 2023 20:55) (75 - 84)  BP: 117/73 (10 Jul 2023 20:55) (116/70 - 121/74)  BP(mean): --  RR: 18 (10 Jul 2023 20:55) (18 - 18)  SpO2: 99% (10 Jul 2023 20:55) (98% - 99%)    Parameters below as of 10 Jul 2023 20:55  Patient On (Oxygen Delivery Method): room air        PHYSICAL EXAM:  GENERAL: NAD, well-developed  HEAD:  Atraumatic, Normocephalic  EYES: EOMI, PERRLA, conjunctiva and sclera clear  NECK: Supple, No JVD  CHEST/LUNG: Clear to auscultation bilaterally; No wheeze  HEART: Regular rate and rhythm; No murmurs, rubs, or gallops  ABDOMEN: Soft, Nontender, Nondistended; Bowel sounds present  EXTREMITIES:  2+ Peripheral Pulses, No clubbing, cyanosis, or edema  PSYCH: AAOx3  NEUROLOGY: non-focal  SKIN: No rashes or lesions    LABS:                      RADIOLOGY & ADDITIONAL TESTS:    Imaging Personally Reviewed:    Consultant(s) Notes Reviewed:      Care Discussed with Consultants/Other Providers:

## 2023-07-10 NOTE — PROGRESS NOTE ADULT - SUBJECTIVE AND OBJECTIVE BOX
Patient seen and examined at bedside. Patient reports pain well controlled on medications. No acute events overnight. Pt denies fevers, chills, new onset numbness, weakness or tingling in the extremities.    T(C): 36.8 (07-10-23 @ 04:38), Max: 37.5 (07-09-23 @ 12:40)  T(F): 98.2 (07-10-23 @ 04:38), Max: 99.5 (07-09-23 @ 12:40)  HR: 75 (07-10-23 @ 04:38) (75 - 86)  BP: 116/70 (07-10-23 @ 04:38) (116/70 - 124/77)  RR: 18 (07-10-23 @ 04:38) (18 - 18)  SpO2: 98% (07-10-23 @ 04:38) (97% - 98%)        SPINE:  Skin intact  NTTP over the bony prominences of the cervical / thoracic / lumbar / sacral spine  + Paraspinal TTP of the lumbar / spine  No Paraspinal TTP of the cervical / thoracic / sacral  No bony step-offs  Grossly moving all extremities  + Radial Pulse  + DP/PT Pulses  No saddle anesthesia  +  rectal tone      MOTOR EXAM:                          Elbow Flex (C5)     Wrist Ext (C6)     Elbow Ext (C7)      Finger Flex (C8)    Finger Abduction (T1)  RIGHT                 5/5                         5/5                         5/5                          5/5                              5/5  LEFT                    5/5                         5/5                         5/5                          5/5                              5/5                           Hip Flex (L2)      Knee Ext (L3)      Ank Dorsiflex (L4)     Hallux Ext (L5)     Ank PlantarFlex (S1)  RIGHT               5/5                      5/5                          5/5                            5/5                           5/5  LEFT                  4/5                      5/5                          5/5                            5/5                           5/5    **LE exam limited 2/2 pain, patient reports no subjective weakness    SENSORY EXAM:                        C5      C6      C7      C8       T1          RIGHT          2         2        2         2         2          (0=absent, 1=impaired, 2=normal, NT=not testable)  LEFT             2         2        2         2         2          (0=absent, 1=impaired, 2=normal, NT=not testable)                        L2        L3       L4      L5       S1          RIGHT        2          2         2        2        2           (0=absent, 1=impaired, 2=normal, NT=not testable)  LEFT           2          2         1        1        2           (0=absent, 1=impaired, 2=normal, NT=not testable)    Negative Diop's sign bilaterally  Negative Babinski bilaterally   Negative Myoclonus bilaterally  Negative  and Release Test  Negative Spurling's Test          A/P :   Patient is a 38yFemale w/ Cervical, Thoracic, Lumbar DDD, 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 Does not demonstrate red flag symptoms such as bowel/bladder incontinence, saddle anesthesia, fevers/chills, or weight loss  -Patient was extensively educated about the signs and symptoms of osteomyelitis, epidural abscess, discitis, acute cord compression. The patient was advised to inform provider should she develop neurological symptoms such as new onset acute weakness, numbness/tingling/paresthesias, worsening pain, bowel/bladder incontinence, or fevers/chills.  -Recommend weight loss/core strengthening for improved back health  -No heavy lifting/twisting  -Multimodal analgesia - recommend low dose opioids, acetaminophen, muscle relaxant as tolerated  -Plan for MRI C/LSp to further delineate pathology;   -Neuro c/s appreciated, fu imaging rec'd  -CRP 91  -All patient's questions answered. Patient understands and agrees w/ above plan.  -Would hold steroid until MRI completion, likely WBC elevated 2/2 course of outpatient prednisone; However CRP 91, steroids started [facility] 1 week prior during onset of symptoms per patient; If concern for abscess arises, would need C/T/LSp w/ and w/o   -Further plan pending MRI    Patient seen and examined at bedside. Patient reports pain well controlled on medications. No acute events overnight. Pt denies fevers, chills, new onset numbness, weakness or tingling in the extremities.    T(C): 36.8 (07-10-23 @ 04:38), Max: 37.5 (07-09-23 @ 12:40)  T(F): 98.2 (07-10-23 @ 04:38), Max: 99.5 (07-09-23 @ 12:40)  HR: 75 (07-10-23 @ 04:38) (75 - 86)  BP: 116/70 (07-10-23 @ 04:38) (116/70 - 124/77)  RR: 18 (07-10-23 @ 04:38) (18 - 18)  SpO2: 98% (07-10-23 @ 04:38) (97% - 98%)        SPINE:  Skin intact  NTTP over the bony prominences of the cervical / thoracic / lumbar / sacral spine  + Paraspinal TTP of the lumbar / spine  No Paraspinal TTP of the cervical / thoracic / sacral  No bony step-offs  Grossly moving all extremities  + Radial Pulse  + DP/PT Pulses  No saddle anesthesia  +  rectal tone      MOTOR EXAM:                          Elbow Flex (C5)     Wrist Ext (C6)     Elbow Ext (C7)      Finger Flex (C8)    Finger Abduction (T1)  RIGHT                 5/5                         5/5                         5/5                          5/5                              5/5  LEFT                    5/5                         5/5                         5/5                          5/5                              5/5                           Hip Flex (L2)      Knee Ext (L3)      Ank Dorsiflex (L4)     Hallux Ext (L5)     Ank PlantarFlex (S1)  RIGHT               5/5                      5/5                          5/5                            5/5                           5/5  LEFT                  4/5                      5/5                          5/5                            5/5                           5/5    **LE exam limited 2/2 pain, patient reports no subjective weakness    SENSORY EXAM:                        C5      C6      C7      C8       T1          RIGHT          2         2        2         2         2          (0=absent, 1=impaired, 2=normal, NT=not testable)  LEFT             2         2        2         2         2          (0=absent, 1=impaired, 2=normal, NT=not testable)                        L2        L3       L4      L5       S1          RIGHT        2          2         2        2        2           (0=absent, 1=impaired, 2=normal, NT=not testable)  LEFT           2          2         1        1        2           (0=absent, 1=impaired, 2=normal, NT=not testable)    Negative Diop's sign bilaterally  Negative Babinski bilaterally   Negative Myoclonus bilaterally  Negative  and Release Test  Negative Spurling's Test          A/P :   Patient is a 38yFemale w/ Cervical, Thoracic, Lumbar DDD, 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 Does not demonstrate red flag symptoms such as bowel/bladder incontinence, saddle anesthesia, fevers/chills, or weight loss  -Patient was extensively educated about the signs and symptoms of osteomyelitis, epidural abscess, discitis, acute cord compression. The patient was advised to inform provider should she develop neurological symptoms such as new onset acute weakness, numbness/tingling/paresthesias, worsening pain, bowel/bladder incontinence, or fevers/chills.  -Recommend weight loss/core strengthening for improved back health  -No heavy lifting/twisting  -Multimodal analgesia - recommend low dose opioids, acetaminophen, muscle relaxant as tolerated  -Neuro c/s appreciated, fu imaging rec'd  -CRP 91  -All patient's questions answered. Patient understands and agrees w/ above plan.  -MRI Negative for abscess, no further orthopedic intervention at this time, signing off

## 2023-07-10 NOTE — PROGRESS NOTE ADULT - ASSESSMENT
38 f with    Back pain  - MR LS pending    - NSAID  - Flexeril  - PT  - Neurology evaluation noted    L arm and L leg numbness improving   - CT head noted    Anemia iron deficiency  - iron studies noted  - start Iron  - need GI evaluation     DVT prophylaxis    Davion Jefferson MD phone [phone]

## 2023-07-11 ENCOUNTER — TRANSCRIPTION ENCOUNTER (OUTPATIENT)
Age: 38
End: 2023-07-11

## 2023-07-11 PROCEDURE — 99223 1ST HOSP IP/OBS HIGH 75: CPT

## 2023-07-11 RX ORDER — IRON SUCROSE 20 MG/ML
200 INJECTION, SOLUTION INTRAVENOUS EVERY 24 HOURS
Refills: 0 | Status: COMPLETED | OUTPATIENT
Start: 2023-07-11 | End: 2023-07-16

## 2023-07-11 RX ORDER — ASCORBIC ACID 60 MG
500 TABLET,CHEWABLE ORAL DAILY
Refills: 0 | Status: DISCONTINUED | OUTPATIENT
Start: 2023-07-11 | End: 2023-07-16

## 2023-07-11 RX ADMIN — IRON SUCROSE 110 MILLIGRAM(S): 20 INJECTION, SOLUTION INTRAVENOUS at 21:03

## 2023-07-11 RX ADMIN — Medication 500 MILLIGRAM(S): at 17:15

## 2023-07-11 RX ADMIN — Medication 500 MILLIGRAM(S): at 17:14

## 2023-07-11 RX ADMIN — Medication 325 MILLIGRAM(S): at 11:26

## 2023-07-11 RX ADMIN — Medication 500 MILLIGRAM(S): at 05:34

## 2023-07-11 RX ADMIN — Medication 650 MILLIGRAM(S): at 12:20

## 2023-07-11 RX ADMIN — Medication 650 MILLIGRAM(S): at 11:25

## 2023-07-11 RX ADMIN — ENOXAPARIN SODIUM 40 MILLIGRAM(S): 100 INJECTION SUBCUTANEOUS at 05:34

## 2023-07-11 RX ADMIN — Medication 500 MILLIGRAM(S): at 18:00

## 2023-07-11 RX ADMIN — PANTOPRAZOLE SODIUM 40 MILLIGRAM(S): 20 TABLET, DELAYED RELEASE ORAL at 05:33

## 2023-07-11 RX ADMIN — CYCLOBENZAPRINE HYDROCHLORIDE 10 MILLIGRAM(S): 10 TABLET, FILM COATED ORAL at 05:38

## 2023-07-11 NOTE — DISCHARGE NOTE PROVIDER - HOSPITAL COURSE
38-year-old female past medical history of remote anemia previously receiving iron transfusions here for lower back pain with some associated left arm numbness x1 week.  Patient denies any trauma to the back, injection prior to the pain, fever, chest pain, abdominal pain, nausea, vomiting, shortness of breath, bowel or bladder incontinence, saddle anesthesia.  Patient cannot recall inciting event for pain but states that pain is primarily in the lower back paraspinal greater than midline radiating down the legs and making it difficult for her to move and walk.  Previously ambulated without difficulty now needs a wheelchair to navigate at home.  Was previously seen at [facility] earlier in the week and was prescribed methocarbamol, ibuprofen, oxycodone, cyclobenzaprine without any relief of pain.         38-year-old female past medical history of remote anemia previously receiving iron transfusions here for lower back pain with some associated left arm numbness x1 week.  Patient denies any trauma to the back, injection prior to the pain, fever, chest pain, abdominal pain, nausea, vomiting, shortness of breath, bowel or bladder incontinence, saddle anesthesia.  Patient cannot recall inciting event for pain but states that pain is primarily in the lower back paraspinal greater than midline radiating down the legs and making it difficult for her to move and walk.  Also c/o L sided numbness. Previously ambulated without difficulty now needs a wheelchair to navigate at home.  Was previously seen at [facility] earlier in the week and was prescribed methocarbamol, ibuprofen, oxycodone, cyclobenzaprine without any relief of pain.  CT with degenerative disease, mild disc bulges and disc herniaiton, Pain control with NSAIDS/ Flexaril .MRI head with out acute pathology.  Neuro and ortho was consulted. No intervention. To follow up with neurology out patient. Admitted with hb of 6.7, received 1 unit prbc, started IV iron x 5 days. Now HB around 8. S/P endoscopy/colonoscopy. Revealed to have erythematous mucosa of stomach and non bleeding gastropathy.   Medically cleared and patient to follow up with DR hopkins in 3 weeks for biopsy result. PT recommended for home PT.   38-year-old female past medical history of remote anemia previously receiving iron transfusions here for lower back pain with some associated left arm numbness x1 week.  Patient denies any trauma to the back, injection prior to the pain, fever, chest pain, abdominal pain, nausea, vomiting, shortness of breath, bowel or bladder incontinence, saddle anesthesia.  Patient cannot recall inciting event for pain but states that pain is primarily in the lower back paraspinal greater than midline radiating down the legs and making it difficult for her to move and walk.  Also c/o L sided numbness. Previously ambulated without difficulty now needs a wheelchair to navigate at home.  Was previously seen at [facility] earlier in the week and was prescribed methocarbamol, ibuprofen, oxycodone, cyclobenzaprine without any relief of pain.  CT with degenerative disease, mild disc bulges and disc herniaiton, Pain control with NSAIDS/ Flexaril .MRI head with out acute pathology.  Neuro and ortho was consulted. No intervention. To follow up with neurology out patient. Admitted with hb of 6.7, received 1 unit prbc, started IV iron x 5 days. Now HB around 8. S/P endoscopy/colonoscopy. Revealed to have erythematous mucosa of stomach and non bleeding gastropathy. Non-bleeding erosive gastropathy & Normal examined duodenum Biopsied.   Medically cleared and patient to follow up with DR hopkins in 3 weeks for biopsy result. PT recommended for home PT.

## 2023-07-11 NOTE — DISCHARGE NOTE PROVIDER - NSDCMRMEDTOKEN_GEN_ALL_CORE_FT
cyclobenzaprine 10 mg oral tablet: 1 tab(s) orally once a day (at bedtime)  ibuprofen 600 mg oral tablet: 1 tab(s) orally every 6 hours  methocarbamol 750 mg oral tablet: 1 tab(s) orally 3 times a day  oxyCODONE 5 mg oral tablet: 1 tab(s) orally 3 times a day as needed   3:1 commode: ashok 99  ascorbic acid 500 mg oral tablet: 1 tab(s) orally once a day  cyclobenzaprine 10 mg oral tablet: 1 tab(s) orally 3 times a day  cyclobenzaprine 10 mg oral tablet: 1 tab(s) orally once a day (at bedtime)  ibuprofen 600 mg oral tablet: 1 tab(s) orally every 6 hours  methocarbamol 750 mg oral tablet: 1 tab(s) orally 3 times a day  naproxen 500 mg oral tablet: 1 tab(s) orally 2 times a day  oxyCODONE 5 mg oral tablet: 1 tab(s) orally 3 times a day as needed  pantoprazole 40 mg oral delayed release tablet: 1 tab(s) orally once a day (before a meal)  wheelchair: ashok 99   ascorbic acid 500 mg oral tablet: 1 tab(s) orally once a day  cyclobenzaprine 10 mg oral tablet: 1 tab(s) orally 3 times a day  cyclobenzaprine 10 mg oral tablet: 1 tab(s) orally once a day (at bedtime)  ibuprofen 600 mg oral tablet: 1 tab(s) orally every 6 hours  methocarbamol 750 mg oral tablet: 1 tab(s) orally 3 times a day  naproxen 500 mg oral tablet: 1 tab(s) orally 2 times a day  oxyCODONE 5 mg oral tablet: 1 tab(s) orally 3 times a day as needed  pantoprazole 40 mg oral delayed release tablet: 1 tab(s) orally once a day (before a meal)   ascorbic acid 500 mg oral tablet: 1 tab(s) orally once a day  cyclobenzaprine 10 mg oral tablet: 1 tab(s) orally 3 times a day  ibuprofen 600 mg oral tablet: 1 tab(s) orally every 6 hours  naproxen 500 mg oral tablet: 1 tab(s) orally 2 times a day  pantoprazole 40 mg oral delayed release tablet: 1 tab(s) orally once a day (before a meal)   ascorbic acid 500 mg oral tablet: 1 tab(s) orally once a day  cyclobenzaprine 10 mg oral tablet: 1 tab(s) orally 3 times a day  ferrous sulfate 325 mg (65 mg elemental iron) oral tablet: 1 tab(s) orally once a day  ibuprofen 600 mg oral tablet: 1 tab(s) orally every 6 hours  naproxen 500 mg oral tablet: 1 tab(s) orally 2 times a day  pantoprazole 40 mg oral delayed release tablet: 1 tab(s) orally once a day (before a meal)

## 2023-07-11 NOTE — DISCHARGE NOTE PROVIDER - CARE PROVIDER_API CALL
Miguel Angel Delgado  Gastroenterology  [address], [address]  [place] [address]  Phone: [phone]  Fax: [phone]  Follow Up Time:    Miguel Angel Delgado  Gastroenterology  [address], [address]  [place] [address]  Phone: [phone]  Fax: [phone]  Follow Up Time:     Lina Hernandez  Obstetrics and Gynecology  [address], [address]  [place] [address]  Phone: [phone]  Fax: [phone]  Follow Up Time: 2 weeks

## 2023-07-11 NOTE — PROGRESS NOTE ADULT - ASSESSMENT
38 f with    Back pain  - MR LS noted  - NSAID  - Flexeril  - PT  - Neurology evaluation noted    L arm and L leg numbness improving   - CT head noted    Anemia iron deficiency  - iron studies noted  - Iron supplementation  - GI evaluation  - Hematology evaluation    Hx of Methroragia  - Gyn follow as OTP      DVT prophylaxis    Davion Jefferson MD phone [phone]

## 2023-07-11 NOTE — CONSULT NOTE ADULT - SUBJECTIVE AND OBJECTIVE BOX
Neurology - Consult Note    -  Spectra: [phone] ([facility]), [phone] ([facility])  -    HPI: Patient LOUIS BRADY is a 38y (1985) woman with a PMHx significant for remote anemia, previous receiving iron transfusions presented for one week of progressively worsening numbness and pain on her left upper and lower extremities. She said that the pain was non traumatic and has progressively gotten worse. She went to [facility] last week who gave her an XR of her back which was negative and discharged her on steroids and muscle relaxants. The pain has progressed to the point where the patient, who is normally very mobile, is now using a rolling wheelchair, requiring it to go to the bathroom. She has finished her dose of prednisone and is currently taking cyclobenzaprine, ibuprofen, Methocarbamol and oxycodone. Upon evaluation in the [facility] ED, the patient is lying on her right side. She says that the pain radiates from her lower back and down her left inguinal area. She says that the numbness is in her left upper extremity and her left sole.      Of note, the patient denies incontinence of bowel or bladder. Denies any personal history with strokes.       Review of Systems:    CONSTITUTIONAL: No fevers or chills  EYES AND ENT: No visual changes   RESPIRATORY: No shortness of breath  CARDIOVASCULAR: No chest pain   NEUROLOGICAL: +As stated in HPI above  All other review of systems is negative unless indicated above.    Allergies:  No Known Allergies      PMHx/PSHx/Family Hx: As above, otherwise see below   Anemia of pregnancy    Multiparity    History of anemia        Social Hx:  No current use of tobacco, alcohol, or illicit drugs  Lives with ***    Medications:  MEDICATIONS  (STANDING):  enoxaparin Injectable 40 milliGRAM(s) SubCutaneous every 24 hours  naproxen 500 milliGRAM(s) Oral two times a day  pantoprazole    Tablet 40 milliGRAM(s) Oral before breakfast    MEDICATIONS  (PRN):  acetaminophen     Tablet .. 650 milliGRAM(s) Oral every 6 hours PRN Temp greater or equal to 38.5C (101.3F), Mild Pain (1 - 3)  cyclobenzaprine 10 milliGRAM(s) Oral three times a day PRN Muscle Spasm      Vitals:  T(C): 36.4 (07-07-23 @ 17:30), Max: 37.5 (07-07-23 @ 13:07)  HR: 80 (07-07-23 @ 17:30) (80 - 113)  BP: 116/60 (07-07-23 @ 17:30) (116/60 - 144/99)  RR: 17 (07-07-23 @ 17:30) (15 - 20)  SpO2: 96% (07-07-23 @ 17:30) (95% - 99%)    Physical Examination:   General - NAD, pleasant, cooperative, in pain laying on her right side    Cardiovascular - Peripheral pulses palpable, no edema  Neurologic Exam:  Mental status - Awake, Alert, Oriented to person, place, and time. Speech fluent, repetition and naming intact. Follows simple and complex commands. Attention/concentration, recent and remote memory (including registration and recall), and fund of knowledge intact    Cranial nerves:  CN II: Pupils are 3 mm and briskly reactive to light.   CN III, IV, VI: EOMI, no nystagmus, no ptosis  CN V: Facial sensation is intact to pinprick in all 3 divisions bilaterally.  CN VII: Face is symmetric with normal eye closure and smile.  CN VII: Hearing is normal to rubbing fingers  CN IX, X: Palate elevates symmetrically. Phonation is normal.  CN XI: Head turning and shoulder shrug are intact  CN XII: Tongue is midline with normal movements and no atrophy.    Motor - Normal bulk and tone throughout. No pronator drift of out-stretched arms.  Strength testing            Deltoid(C5)  Biceps(C6)    Triceps(C7)     Wrist Extension    Wrist Flexion (C8)     Interossei  (T1)       R            5                 5                        5                     5                              5                                      5                         5  L             5                 5                        5                     5                              5                                      5                          5              Hip Flexion(L2/3)    Hip Extension (L4/5)   Knee Flexion (L4/5/S1)    Knee Extension (L3/4)   Dorsiflexion (L4/5)   Plantar Flexion (S1)  R              3                                    3                                     3                              4+                                              5                          5  L              3                                     3                                    3                               4+                                             4+                          5  Lower extremity motor exam confounded by pain     Sensation - Light touch and pain intact in fingers and toes. Similar sensation to light touch and pain in upper extremities. Difference in the sensation to light touch and pain in soles of left foot (L4/5 level)    DTR's -             Biceps      Triceps     Brachioradialis      Patellar    Ankle    Toes/plantar response  R             2+             2+                  2+                       2+            2+                 Down  L              2+             2+                 2+                        2+           2+                 Down    Coordination - Rapid alternating movements and fine finger movements are intact. There is no dysmetria on finger-to-nose    Gait and station - Unable to assess due to apin        Labs:                        7.2    12.18 )-----------( 650      ( 07 Jul 2023 14:44 )             26.9     07-07    136  |  99  |  8   ----------------------------<  88  4.7   |  25  |  0.58    Ca    8.8      07 Jul 2023 14:44    TPro  7.2  /  Alb  3.5  /  TBili  0.5  /  DBili  x   /  AST  56<H>  /  ALT  16  /  AlkPhos  72  07-07    CAPILLARY BLOOD GLUCOSE        LIVER FUNCTIONS - ( 07 Jul 2023 14:44 )  Alb: 3.5 g/dL / Pro: 7.2 g/dL / ALK PHOS: 72 U/L / ALT: 16 U/L / AST: 56 U/L / GGT: x               CSF:                  Radiology:    
Patient is a 38y Female community ambulator without assistive devices who presents to ED w/ a c/o of 1 wk of low back pain, LLE radicular type symptoms and LUE radicular type symptoms. Patient states she went to [facility] 1 wk prior for the pain where the discharged her on a course of oral steroid. Denies Trauma/HS/LOC. Localizing pain to lower back, denies radiation of pain elsewhere besides radicular type symtpoms down LLE / LUE. States pain w/ ambulation. Denies paresthesias/weakness, denies incontinence of bowel/bladder.  Denies having any other pain elsewhere. Denies any previous orthopaedic history. No other orthopaedic concerns at this time.     PAST MEDICAL & SURGICAL HISTORY:  Anemia of pregnancy  resolved      Multiparity      History of anemia      H/O tubal ligation  2016          Vital Signs Last 24 Hrs  T(C): 36.4 (07 Jul 2023 17:30), Max: 37.5 (07 Jul 2023 13:07)  T(F): 97.6 (07 Jul 2023 17:30), Max: 99.5 (07 Jul 2023 13:07)  HR: 80 (07 Jul 2023 17:30) (80 - 113)  BP: 116/60 (07 Jul 2023 17:30) (116/60 - 144/99)  BP(mean): --  RR: 17 (07 Jul 2023 17:30) (15 - 20)  SpO2: 96% (07 Jul 2023 17:30) (95% - 99%)    Parameters below as of 07 Jul 2023 17:30  Patient On (Oxygen Delivery Method): room air      I&O's Detail      LABS:                        7.2    12.18 )-----------( 650      ( 07 Jul 2023 14:44 )             26.9     07-07    136  |  99  |  8   ----------------------------<  88  4.7   |  25  |  0.58    Ca    8.8      07 Jul 2023 14:44    TPro  7.2  /  Alb  3.5  /  TBili  0.5  /  DBili  x   /  AST  56<H>  /  ALT  16  /  AlkPhos  72  07-07      Urinalysis Basic - ( 07 Jul 2023 14:44 )    Color: x / Appearance: x / SG: x / pH: x  Gluc: 88 mg/dL / Ketone: x  / Bili: x / Urobili: x   Blood: x / Protein: x / Nitrite: x   Leuk Esterase: x / RBC: x / WBC x   Sq Epi: x / Non Sq Epi: x / Bacteria: x        PHYSICAL EXAM:  General; Awake and alert, Oriented x 3  Spine: No TTP over spinous processes or paraspinal muscles at C/T/L spine. No palpable step off.     Able to actively SLR BL but limited on the L 2/2 pain. No pain to passive SLR on the R, pain w/ SLR passive L      PHYSICAL EXAM  GEN: NAD, AAOx3    SPINE:  Skin intact  NTTP over the bony prominences of the cervical / thoracic / lumbar / sacral spine  + Paraspinal TTP of the lumbar / spine  No Paraspinal TTP of the cervical / thoracic / sacral  No bony step-offs  Grossly moving all extremities  + Radial Pulse  + DP/PT Pulses  No saddle anesthesia  +  rectal tone      MOTOR EXAM:                          Elbow Flex (C5)     Wrist Ext (C6)     Elbow Ext (C7)      Finger Flex (C8)    Finger Abduction (T1)  RIGHT                 5/5                         5/5                         5/5                          5/5                              5/5  LEFT                    5/5                         5/5                         5/5                          5/5                              5/5                           Hip Flex (L2)      Knee Ext (L3)      Ank Dorsiflex (L4)     Hallux Ext (L5)     Ank PlantarFlex (S1)  RIGHT               5/5                      5/5                          5/5                            5/5                           5/5  LEFT                  4/5                      5/5                          5/5                            5/5                           5/5      SENSORY EXAM:                        C5      C6      C7      C8       T1          RIGHT          2         2        2         2         2          (0=absent, 1=impaired, 2=normal, NT=not testable)  LEFT             2         2        2         2         2          (0=absent, 1=impaired, 2=normal, NT=not testable)                        L2        L3       L4      L5       S1          RIGHT        2          2         2        2        2           (0=absent, 1=impaired, 2=normal, NT=not testable)  LEFT           2          2         1        1        2           (0=absent, 1=impaired, 2=normal, NT=not testable)    Negative Diop's sign bilaterally  Negative Babinski bilaterally   Negative Myoclonus bilaterally  Negative  and Release Test  Negative Spurling's Test                Secondary  Skin intact. No erythema/ecchymosis. No TTP over bony prominences, SILT, palpable pulses, full/painless A/PROM, compartments soft. No other injuries or complaints. Negative logroll/heelstrike BL.     Imaging:                                          A/P :   Patient is a 38yFemale w/ Cervical, Thoracic, Lumbar DDD, 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 Does not demonstrate red flag symptoms such as bowel/bladder incontinence, saddle anesthesia, fevers/chills, or weight loss  -Patient was extensively educated about the signs and symptoms of osteomyelitis, epidural abscess, discitis, acute cord compression. The patient was advised to inform provider should she develop neurological symptoms such as new onset acute weakness, numbness/tingling/paresthesias, worsening pain, bowel/bladder incontinence, or fevers/chills.  -Recommend weight loss/core strengthening for improved back health  -No heavy lifting/twisting  -Multimodal analgesia - recommend low dose opioids, acetaminophen, muscle relaxant as tolerated  -Plan for MRI C/LSp  -All patient's questions answered. Patient understands and agrees w/ above plan.  -Would hold steroid until MRI completion, likely WBC elevated 2/2 course of outpatient prednisone   -Further plan pending MRI     
    Patient is a 38y old  Female who presents with a chief complaint of Back Pain (11 Jul 2023 04:06)      HPI:  38-year-old female past medical history of remote anemia previously receiving iron transfusions here for lower back pain with some associated left arm numbness x1 week.  Patient denies any trauma to the back, injection prior to the pain, fever, chest pain, abdominal pain, nausea, vomiting, shortness of breath, bowel or bladder incontinence, saddle anesthesia.  Patient cannot recall inciting event for pain but states that pain is primarily in the lower back paraspinal greater than midline radiating down the legs and making it difficult for her to move and walk.  Previously ambulated without difficulty now needs a wheelchair to navigate at home.  Was previously seen at [facility] earlier in the week and was prescribed methocarbamol, ibuprofen, oxycodone, cyclobenzaprine without any relief of pain. (07 Jul 2023 18:34)      Noted to have anemia for which GI is consulted  Hx of chronic anemia  "all my life"  unknown if carries Sickle cell trait, suspected Thallasemia trait (daughter+)    +menorrhagia - on Day 2/3/4 of (6-7 day menses) soaks through overnight pads requiring change [id number]  regular monthly menses  s/p tubal ligation, s/p cone biopsy (cervix 11/2021)  follows vegetarian diet, started B12 supplements 2 weeks ago  previous PRBCs infusion pre-op cone biopsy; seen by Hem-Onc 2021    no previous GI work-up  usual bowel habits 2BM/day; firmer stools since starting iron (admits to taking infrequently)  no melena, rectal bleeding, nausea, vomiting, weight loss  no personal or FH of IBD or celiac  aunt (RN) was found to have SB neuroendocrine tumor during w/u of anemia  no CP, dizziness, SOB, YUSUF or syncope    currently with continued severe back pain - improved since admission but worsened with movement, ambulation, bending or getting up  no loss of bowel or bladder control  no numbness or paresthesias    Has been taking high dose NSAIDs, no PPI use  appetite good    PAST MEDICAL & SURGICAL HISTORY:  Anemia of pregnancy      Multiparity      History of anemia      H/O tubal ligation  2016          Allergies  No Known Allergies        MEDICATIONS  (STANDING):  enoxaparin Injectable 40 milliGRAM(s) SubCutaneous every 24 hours  ferrous    sulfate 325 milliGRAM(s) Oral daily  naproxen 500 milliGRAM(s) Oral two times a day  pantoprazole    Tablet 40 milliGRAM(s) Oral before breakfast    MEDICATIONS  (PRN):  acetaminophen     Tablet .. 650 milliGRAM(s) Oral every 6 hours PRN Temp greater or equal to 38.5C (101.3F), Mild Pain (1 - 3)  cyclobenzaprine 10 milliGRAM(s) Oral three times a day PRN Muscle Spasm      Social History:    former tobacco  occasional ETOH  works for [employer]    Family History   IBD (  ) Yes   (X  ) No  GI Malignancy ( X )  Yes  aunt - neuroendocrine tumor  (  ) No    Health Management  Last Colonoscopy - NONE      Advanced Directives: (   X  ) None    (      ) DNR    (     ) DNI    (     ) Health Care Proxy:     Review of Systems:  see HPI- remainder 10 point ROS negative      Vital Signs Last 24 Hrs  T(C): 36.6 (11 Jul 2023 12:03), Max: 36.8 (10 Jul 2023 20:55)  T(F): 97.9 (11 Jul 2023 12:03), Max: 98.3 (10 Jul 2023 20:55)  HR: 80 (11 Jul 2023 11:09) (73 - 84)  BP: 146/93 (11 Jul 2023 11:09) (117/73 - 146/93)  BP(mean): --  RR: 18 (11 Jul 2023 11:09) (18 - 18)  SpO2: 98% (11 Jul 2023 11:09) (98% - 99%)    Parameters below as of 11 Jul 2023 11:09  Patient On (Oxygen Delivery Method): room air        PHYSICAL EXAM:    Constitutional: NAD, well-developed AA female sitting up in bed  Neck: No LAD, supple no JVD  Respiratory: good air entry bl no accessory muscle use  Cardiovascular: S1 and S2, RRR  Gastrointestinal: BS+, soft, NT/ND, neg HSM  Extremities: No peripheral edema, neg clubbing, cyanosis  Vascular: 2+ peripheral pulses  Neurological: A/O x 3, no focal deficits sensation intact bl  Psychiatric: Normal mood, normal affect  Skin: No rashes +multiple tattoos        LABS:    Complete Blood Count + Automated Diff (07.07.23 @ 14:44)   WBC Count: 12.18 K/uL  RBC Count: 4.77 M/uL  Hemoglobin: 7.2 g/dL  Hematocrit: 26.9 %  Mean Cell Volume: 56.4 fl  Mean Cell Hemoglobin: 15.1 pg  Mean Cell Hemoglobin Conc: 26.8: Low MCHC. HCT and MCV may be invalid. gm/dL  Red Cell Distrib Width: 22.0 %  Platelet Count - Automated: 650: Specimen integrity verified. K/uL      HCG Quantitative, Serum (07.07.23 @ 14:44)   HCG Quantitative, Serum: <2.0  Sedimentation Rate, Erythrocyte: 87 mm/hr (07.07.23 @ 19:58)   C-Reactive Protein, Serum: 91 mg/L (07.07.23 @ 14:44)       Vitamin B12, Serum: 365 pg/mL (07.07.23 @ 19:58)     Ferritin: 29 ng/mL (07.07.23 @ 19:58)   Iron - Total Binding Capacity.: 441 ug/dL  % Saturation, Iron: 3 %  Iron Total: 14 ug/dL  Unsaturated Iron Binding Capacity: 427 ug/dL      LIVER FUNCTIONS - ( 07 Jul 2023 14:44 )  Alb: 3.5 g/dL / Pro: 7.2 g/dL / ALK PHOS: 72 U/L / ALT: 16 U/L / AST: 56 U/L / GGT: x             RADIOLOGY & ADDITIONAL TESTS:    ACC: [id number] EXAM:  MR ANGIO BRAIN   ORDERED BY: THERESA PIEDRA     ACC: [id number] EXAM:  MR ANGIO NECK WAW IC   ORDERED BY: THERESA PIEDRA     ACC: [id number] EXAM:  MR BRAIN WAW IC   ORDERED BY: THERESA PIEDRA     PROCEDURE DATE:  07/10/2023          INTERPRETATION:  .    CLINICAL INFORMATION: Left-sided numbness.    TECHNIQUE: Multiplanar multi sequential MRI examination of the brain was   performed without the administration of IV gadolinium. MRA images through   the neck and [place] were obtained using a combination of 2-D   and 3-D time-of-flight acquisition. Post contrast MR angiography of the   neck was also performed. The data was then reformatted into a volumetric   data set and reviewed as rotational MIP images. 10 cc's of IV Gadavist   was administered without immediate complication. 0 cc's was discarded.    COMPARISON: Prior head CT exam from 7/7/2023. No prior MRI examinations   are available for comparison.    FINDINGS:    MRI Brain: The brain parenchyma is normal in signal and morphology. There   is no evidence of acute ischemia on the diffusion-weighted images. No   abnormal brain parenchymal or leptomeningeal enhancement is seen.    Ventricular size and configuration is unremarkable. No abnormal extra   axial fluid collections are noted. Flow-voids are noted throughout the   major intracranial vessels, on the T2 weighted images, consistent with   their patency. The sella turcica and posterior fossa are unremarkable.    Diffuse low marrow signal signal is seen on T1-weighted imaging within   the calvarium, clivus, and upper cervical vertebral bodies. Corresponding   diffusion reduction is seen in these locations. Findings may be   compatible with anemia or less likely other marrow infiltrating process,   given the patient's clinical information of anemia.    The paranasal sinuses and mastoid air cells are clear. The orbits appear   unremarkable.    MRA Neck: There is a standard anatomic configuration to the aortic arch.    The origins of the great vessels appear unremarkable. The bilateral   common carotid arteries and carotid bifurcations appear unremarkable.    The bilateral cervical internal carotid arteries are within normal limits.    The origins of the bilateral vertebral arteries are normal. The bilateral   cervical vertebral arteries are normal in course and caliber.    MRA [place]: The bilateral intracranial internal carotid,   anterior, and middle cerebral arteries appear unremarkable.    The anterior and bilateral posterior communicating arteries are notable.    The bilateral intradural vertebral arteries, vertebrobasilar junction,   basilar artery, and basilar tip appear unremarkable as well as the   bilateral posterior cerebral arteries.    IMPRESSION:    MRI BRAIN: No acute intracranial hemorrhage or evidence of acute   ischemia. No abnormal intracranial enhancement.    Diffuse low marrow signal signal is seen on T1-weighted imaging within   the calvarium, clivus, and upper cervical vertebral bodies. Corresponding   diffusion reduction is seen in these locations. Findings may be   compatible with anemia or less likely other marrow infiltrating process,   given the patient's clinical information of anemia.    MRA NECK AND HEAD: Unremarkable studies.    --- End of Report ---    ACC: [id number] EXAM:  MR SPINE LUMBAR   ORDERED BY: JOSE MARIN     ACC: [id number] EXAM:  MR SPINE CERVICAL   ORDERED BY: JOSE MARIN     PROCEDURE DATE:  07/09/2023          INTERPRETATION:  CLINICAL STATEMENT: Left upper extremity radiculopathy.   Numbness.    TECHNIQUE: Multiplanar multisequence noncontrast MRI cervical and lumbar   spine. Multiple images degraded by motion artifact.  COMPARISON: Correlation with CT cervical and lumbar spine 7/7/2023.    FINDINGS:    MRI CERVICAL    No compression fracture or spondylolisthesis. No visualized intrathecal   or paraspinal mass. Evidence of a partially imaged right pleural effusion.    Reversal of the cervical lordosis. Diffusely diminished T1 signal   visualized osseous structures. Varying degrees of multilevel disc   desiccation, endplate and uncovertebral spurring. Mild multilevel disc   space narrowing, without marrow edema. Facet articulations grossly intact.    C2-C3: No disc herniation or stenosis.  C3-C4: Disc bulge, effacing the subarachnoid space, resulting in mild   central canal and mild bilateral neural foramen stenosis with   uncovertebral spurring.  C4-C5: Disc bulge, impinging upon the ventral cord, resulting in mild   central canal stenosis.  C5-C6: Broad-based left paracentral disc herniation superimposed upon a   disc bulge, impinging upon the ventral cord, resulting in mild central   canal and mild left neural foramen stenosis with uncovertebral spurring.  C6-C7: Disc bulge, without stenosis.  C7-T1: No disc herniation or stenosis.    MRI LUMBAR    Partially sacralized (bilaterally) segment is considered L5 for the   purposes of counting.    No visualized intrathecal or paraspinal mass. Nonspecific edema posterior   paraspinal subcutaneous fat can be seen inthe setting of cellulitis.   Diffusely diminished T1 signal visualized osseous structures. No   compression fracture or subluxation. Leftward curvature. Mild multilevel   disc space narrowing with varying degrees of multilevel disc desiccation   and endplate spurring. No visualized spondylolysis defect.    L1-L2: No disc herniation or stenosis.  L2-L3: Disc bulge with central annular tear, without stenosis.  L3-L4: Disc bulge with central annular tear, without stenosis.  L4-L5: Extruded left paracentral disc herniation superimposed upon a disc   bulge, demonstrating approximately 1.5 cm inferior migration of disc   material, impinging upon the thecal sac, resulting in mild-moderate   central canal, left greater than right lateral recess and left greater   than right neural foramen stenosis with facet hypertrophy, impinging upon   the left L5 nerve roots. Superimposed sequestered disc fragment cannot be   excluded in the left lateral recess at this level.  L5-S1: No disc herniation or stenosis.    IMPRESSION:    MRI CERVICAL  1. Reversal of the cervical lordosis may reflect muscle spasm.  2. C5-C6 broad-based left paracentral disc herniation superimposed upon a   disc bulge, impinging upon the ventral cord, resulting in mild central   canal and mild left neural foramen stenosis with uncovertebral spurring.  3. Additional findings described in detail above, including a partially   imaged right pleural effusion as well as diminished T1 signal visualized   osseous structures; the latter of which is nonspecific however can be   seen in the setting of red marrow hyperplasia secondary to chronic   anemia. Recommend PA and lateral chest radiographs as well as hematologic   evaluation.    MRI LUMBAR  1. Levoscoliosis.  2. Partially sacralized L5 segment. Correlate for signs of Bertolotti   syndrome.  3. L4-L5 extruded left paracentral disc herniation superimposed upon a   disc bulge, demonstrating approximately 1.5 cm inferior migration of disc   material, impinging upon the thecalsac, resulting in mild-moderate   central canal, left greater than right lateral recess and left greater   than right neural foramen stenosis with facet hypertrophy, impinging upon   the left L5 nerve roots. Superimposed sequestered fragment cannot be   definitively excluded in the left lateral recess at this level.  4. Additional findings described in detail above, including diminished T1   signal visualized osseous structures; a nonspecific however can be seen   in the setting of red marrow hyperplasia secondary to chronic anemia.   Recommend hematologic evaluation.      --- End of Report ---

## 2023-07-11 NOTE — PROGRESS NOTE ADULT - SUBJECTIVE AND OBJECTIVE BOX
Patient is a 38y old  Female who presents with a chief complaint of Back Pain (11 Jul 2023 04:06)      SUBJECTIVE / OVERNIGHT EVENTS: No new complaints.   Review of Systems  chest pain no  palpitations no  sob no  nausea no  headache no    MEDICATIONS  (STANDING):  enoxaparin Injectable 40 milliGRAM(s) SubCutaneous every 24 hours  ferrous    sulfate 325 milliGRAM(s) Oral daily  naproxen 500 milliGRAM(s) Oral two times a day  pantoprazole    Tablet 40 milliGRAM(s) Oral before breakfast    MEDICATIONS  (PRN):  acetaminophen     Tablet .. 650 milliGRAM(s) Oral every 6 hours PRN Temp greater or equal to 38.5C (101.3F), Mild Pain (1 - 3)  cyclobenzaprine 10 milliGRAM(s) Oral three times a day PRN Muscle Spasm      Vital Signs Last 24 Hrs  T(C): 36.6 (11 Jul 2023 12:03), Max: 36.8 (10 Jul 2023 20:55)  T(F): 97.9 (11 Jul 2023 12:03), Max: 98.3 (10 Jul 2023 20:55)  HR: 80 (11 Jul 2023 11:09) (73 - 84)  BP: 146/93 (11 Jul 2023 11:09) (117/73 - 146/93)  BP(mean): --  RR: 18 (11 Jul 2023 11:09) (18 - 18)  SpO2: 98% (11 Jul 2023 11:09) (98% - 99%)    Parameters below as of 11 Jul 2023 11:09  Patient On (Oxygen Delivery Method): room air        PHYSICAL EXAM:  GENERAL: NAD   HEAD:  Atraumatic, Normocephalic  EYES: EOMI, PERRLA, conjunctiva and sclera clear  NECK: Supple, No JVD  CHEST/LUNG: Clear to auscultation bilaterally; No wheeze  HEART: Regular rate and rhythm; No murmurs, rubs, or gallops  ABDOMEN: Soft, Nontender, Nondistended; Bowel sounds present  EXTREMITIES:  2+ Peripheral Pulses, No clubbing, cyanosis, or edema  PSYCH: AAOx3  NEUROLOGY: non-focal  SKIN: No rashes or lesions    LABS:                      RADIOLOGY & ADDITIONAL TESTS:    Imaging Personally Reviewed:    < from: MR Lumbar Spine No Cont (07.09.23 @ 15:04) >  MRI LUMBAR  1. Levoscoliosis.  2. Partially sacralized L5 segment. Correlate for signs of Bertolotti   syndrome.  3. L4-L5 extruded left paracentral disc herniation superimposed upon a   disc bulge, demonstrating approximately 1.5 cm inferior migration of disc   material, impinging upon the thecalsac, resulting in mild-moderate   central canal, left greater than right lateral recess and left greater   than right neural foramen stenosis with facet hypertrophy, impinging upon   the left L5 nerve roots. Superimposed sequestered fragment cannot be   definitively excluded in the left lateral recess at this level.  4. Additional findings described in detail above, including diminished T1   signal visualized osseous structures; a nonspecific however can be seen   in the setting of red marrow hyperplasia secondary to chronic anemia.   Recommend hematologic evaluation.    < end of copied text >  Consultant(s) Notes Reviewed:      Care Discussed with Consultants/Other Providers:

## 2023-07-11 NOTE — DISCHARGE NOTE PROVIDER - NSDCCPCAREPLAN_GEN_ALL_CORE_FT
PRINCIPAL DISCHARGE DIAGNOSIS  Diagnosis: Back pain  Assessment and Plan of Treatment: You can  can follow up with general neurology at [address] [place] [address] 1-2 weeks after discharge. Please instruct the patient to call [phone] to schedule this appointment.        SECONDARY DISCHARGE DIAGNOSES  Diagnosis: Anemia  Assessment and Plan of Treatment:      PRINCIPAL DISCHARGE DIAGNOSIS  Diagnosis: Back pain  Assessment and Plan of Treatment: Admitted with back pain and left sided numbness. CT Tspine and L spine with degenerative disease. No need of nay intervention as per neuro and ortho. MRI head also with out any acute findings. You can  can follow up with general neurology at [address] [place] [address] 1-2 weeks after discharge. Please instruct the patient to call [phone] to schedule this appointment.Continue pain medications and PT as recommended.         SECONDARY DISCHARGE DIAGNOSES  Diagnosis: Anemia  Assessment and Plan of Treatment: Your hemoglobin was 6.7 on admission. Received 1 unit blood transfusion. Also received iron transfusions. Now hemoglobin around 8.   Anemia likely from iron defeciency. Endoscopy and colonoscopy shows non bleeding gastropathy. Biopsy was done. To follow up with Dr Delgado in 3 weeks     PRINCIPAL DISCHARGE DIAGNOSIS  Diagnosis: Back pain  Assessment and Plan of Treatment: Admitted with back pain and left sided numbness. CT Tspine and L spine with degenerative disease. No need of nay intervention as per neuro and ortho. MRI head also with out any acute findings. You can  can follow up with general neurology at [address] [place] [address] 1-2 weeks after discharge., call [phone] to schedule this appointment.Continue pain medications and PT as recommended.         SECONDARY DISCHARGE DIAGNOSES  Diagnosis: Anemia  Assessment and Plan of Treatment: Your hemoglobin was 6.7 on admission. Received 1 unit blood transfusion. Also received iron transfusions. Now hemoglobin around 8.   Anemia likely from iron defeciency. Endoscopy and colonoscopy shows non bleeding gastropathy. Biopsy was done. To follow up with Dr Delgado (GI)  in 3 weeks. Follow up with your GYN doctor Dr. Hernandez as outpt in 2-3 weeks.

## 2023-07-11 NOTE — CONSULT NOTE ADULT - ASSESSMENT
38-year-old female past medical history of remote anemia previously receiving iron transfusions here for lower back pain with some associated left arm numbness x1 week.  Noted to have severe iron deficiency anemia    MRI of back with multiple disc herniations - Neuro and Ortho following    #severe microcytic iron deficiency anemia  normal B12  Suspect largely 2/2 menorrhagia but need to evaluate GI tract to r/o occult lesion, sprue etc  r/o thalassemia or SS trait given degree of microcytosis    RECS:  -agree with obtaining Hematology input  -no overt/brisk GI bleed  -agree with PPI for GI ppx, especially i/s/o NSAIDs  -elective outpt EGD + COLON + VCE.  Pt currently with severe back pain and difficulty with sitting/standing/ambulation making prep difficult. Discussed with pt and pt's aunt (RN, at pt request, on speakerphone during interview) Pt and family in agreement  -PO Iron as ordered; compliance urged.  ?role for IV Iron replacement. Will await Hematology input  -Add Vit C to boost iron absorption      Discussed with pt and family (per pt request)  Discussed with Medicine    Galen Best PA-C    [facility]  [phone]  Available on TEAMS Mon-Fri 8a-4p  After hours and weekend coverage [phone]  
ASSESSMENT   38F with h/o anemia presenting with L sided numbness and pain that is progressively worsening. Not relieved by prednisone. Denied bowel, bladder incontinence. Motor exam roughly intact but confounded by pain radiating from lower back down inguinal area. Pending CT head.     IMPRESSION   Left sided numbness and pain in upper and lower extremities , differential broad but considering cervical spondylosis vs lumbar disc herniation. Not a high suspicion for cord compression.     RECOMMENDATION   [] MR brain non con, MRA neck w/ contrast, MRA head w/o contrast   [] MR C/L spine w/wo contrast  [] Pain control per primary team       Patient to be seen by team and attending. Note finalized upon attending attestation.

## 2023-07-11 NOTE — DISCHARGE NOTE PROVIDER - PROVIDER TOKENS
PROVIDER:[TOKEN:[[id number]]] PROVIDER:[TOKEN:[[id number]]],PROVIDER:[TOKEN:[[id number]],FOLLOWUP:[2 weeks]]

## 2023-07-11 NOTE — DISCHARGE NOTE PROVIDER - NSDCFUSCHEDAPPT_GEN_ALL_CORE_FT
Jeanne Plasencia  [facility] Physician Count includes the [facility]  NEUROLOGY [address]  Scheduled Appointment: 07/20/2023    Lina Hernandez  [facility] Physician Count includes the [facility]  OBGYNGEN [address] [facility]  Scheduled Appointment: 07/24/2023    Miguel Angel Delgado  [facility] Physician Count includes the [facility]  GASTRO [address]  Scheduled Appointment: 08/10/2023

## 2023-07-11 NOTE — DISCHARGE NOTE PROVIDER - NSDCFUADDAPPT_GEN_ALL_CORE_FT
APPTS ARE READY TO BE MADE: [ x] YES    Best Family or Patient Contact (if needed):    Additional Information about above appointments (if needed):    1: Follow up with Dr Delgado in 3 weeks  2:   3:     Other comments or requests:    APPTS ARE READY TO BE MADE: [ x] YES    Best Family or Patient Contact (if needed):    Additional Information about above appointments (if needed):    1: Follow up with Dr Delgado in 3 weeks  2: Follow up with outpt GYN Dr. Hernandez in 2-3 weeks   3:     Other comments or requests:    APPTS ARE READY TO BE MADE: [ x] YES    Best Family or Patient Contact (if needed):    Additional Information about above appointments (if needed):    1: Follow up with Dr Delgado in 3 weeks  2: Follow up with outpt GYN Dr. Hernandez in 2-3 weeks   3:     Other comments or requests:   Patient was previously scheduled for an appointment on 07/24 2:40p at [address] [place] [address]  [id number] with Lina Hein  Patient was previously scheduled for an appointment on 08/10 9:30a at [address] [facility]  [id number] with Miguel Angel Contreras

## 2023-07-12 LAB
BASOPHILS # BLD AUTO: 0.05 K/UL — SIGNIFICANT CHANGE UP (ref 0–0.2)
BASOPHILS NFR BLD AUTO: 0.6 % — SIGNIFICANT CHANGE UP (ref 0–2)
BLD GP AB SCN SERPL QL: NEGATIVE — SIGNIFICANT CHANGE UP
EOSINOPHIL # BLD AUTO: 0.2 K/UL — SIGNIFICANT CHANGE UP (ref 0–0.5)
EOSINOPHIL NFR BLD AUTO: 2.6 % — SIGNIFICANT CHANGE UP (ref 0–6)
HCT VFR BLD CALC: 24 % — LOW (ref 34.5–45)
HCT VFR BLD CALC: 24.9 % — LOW (ref 34.5–45)
HGB BLD-MCNC: 6.4 G/DL — CRITICAL LOW (ref 11.5–15.5)
HGB BLD-MCNC: 6.7 G/DL — CRITICAL LOW (ref 11.5–15.5)
IMM GRANULOCYTES NFR BLD AUTO: 0.6 % — SIGNIFICANT CHANGE UP (ref 0–0.9)
LYMPHOCYTES # BLD AUTO: 1.88 K/UL — SIGNIFICANT CHANGE UP (ref 1–3.3)
LYMPHOCYTES # BLD AUTO: 24.4 % — SIGNIFICANT CHANGE UP (ref 13–44)
MCHC RBC-ENTMCNC: 15.1 PG — LOW (ref 27–34)
MCHC RBC-ENTMCNC: 15.3 PG — LOW (ref 27–34)
MCHC RBC-ENTMCNC: 26.7 GM/DL — LOW (ref 32–36)
MCHC RBC-ENTMCNC: 26.9 GM/DL — LOW (ref 32–36)
MCV RBC AUTO: 56.7 FL — LOW (ref 80–100)
MCV RBC AUTO: 56.8 FL — LOW (ref 80–100)
MONOCYTES # BLD AUTO: 0.57 K/UL — SIGNIFICANT CHANGE UP (ref 0–0.9)
MONOCYTES NFR BLD AUTO: 7.4 % — SIGNIFICANT CHANGE UP (ref 2–14)
NEUTROPHILS # BLD AUTO: 4.95 K/UL — SIGNIFICANT CHANGE UP (ref 1.8–7.4)
NEUTROPHILS NFR BLD AUTO: 64.4 % — SIGNIFICANT CHANGE UP (ref 43–77)
NRBC # BLD: 0 /100 WBCS — SIGNIFICANT CHANGE UP (ref 0–0)
NRBC # BLD: 0 /100 WBCS — SIGNIFICANT CHANGE UP (ref 0–0)
PLATELET # BLD AUTO: 614 K/UL — HIGH (ref 150–400)
PLATELET # BLD AUTO: 622 K/UL — HIGH (ref 150–400)
RBC # BLD: 4.23 M/UL — SIGNIFICANT CHANGE UP (ref 3.8–5.2)
RBC # BLD: 4.38 M/UL — SIGNIFICANT CHANGE UP (ref 3.8–5.2)
RBC # FLD: 22.4 % — HIGH (ref 10.3–14.5)
RBC # FLD: 22.5 % — HIGH (ref 10.3–14.5)
RH IG SCN BLD-IMP: POSITIVE — SIGNIFICANT CHANGE UP
WBC # BLD: 7.7 K/UL — SIGNIFICANT CHANGE UP (ref 3.8–10.5)
WBC # BLD: 8.09 K/UL — SIGNIFICANT CHANGE UP (ref 3.8–10.5)
WBC # FLD AUTO: 7.7 K/UL — SIGNIFICANT CHANGE UP (ref 3.8–10.5)
WBC # FLD AUTO: 8.09 K/UL — SIGNIFICANT CHANGE UP (ref 3.8–10.5)

## 2023-07-12 RX ORDER — SOD SULF/SODIUM/NAHCO3/KCL/PEG
1000 SOLUTION, RECONSTITUTED, ORAL ORAL
Refills: 0 | Status: COMPLETED | OUTPATIENT
Start: 2023-07-12 | End: 2023-07-13

## 2023-07-12 RX ADMIN — Medication 500 MILLIGRAM(S): at 12:20

## 2023-07-12 RX ADMIN — Medication 10 MILLIGRAM(S): at 22:20

## 2023-07-12 RX ADMIN — Medication 1000 MILLILITER(S): at 22:22

## 2023-07-12 RX ADMIN — Medication 500 MILLIGRAM(S): at 17:28

## 2023-07-12 RX ADMIN — Medication 500 MILLIGRAM(S): at 05:21

## 2023-07-12 RX ADMIN — CYCLOBENZAPRINE HYDROCHLORIDE 10 MILLIGRAM(S): 10 TABLET, FILM COATED ORAL at 16:31

## 2023-07-12 RX ADMIN — Medication 500 MILLIGRAM(S): at 06:15

## 2023-07-12 RX ADMIN — CYCLOBENZAPRINE HYDROCHLORIDE 10 MILLIGRAM(S): 10 TABLET, FILM COATED ORAL at 12:23

## 2023-07-12 RX ADMIN — Medication 650 MILLIGRAM(S): at 12:23

## 2023-07-12 RX ADMIN — PANTOPRAZOLE SODIUM 40 MILLIGRAM(S): 20 TABLET, DELAYED RELEASE ORAL at 05:21

## 2023-07-12 RX ADMIN — ENOXAPARIN SODIUM 40 MILLIGRAM(S): 100 INJECTION SUBCUTANEOUS at 05:21

## 2023-07-12 RX ADMIN — Medication 10 MILLIGRAM(S): at 16:32

## 2023-07-12 NOTE — PROGRESS NOTE ADULT - SUBJECTIVE AND OBJECTIVE BOX
Patient is a 38y old  Female who presents with a chief complaint of Back Pain (11 Jul 2023 04:06)      SUBJECTIVE / OVERNIGHT EVENTS: No new complaints.   Review of Systems  chest pain no  palpitations no  sob no  nausea no  headache no    MEDICATIONS  (STANDING):  ascorbic acid 500 milliGRAM(s) Oral daily  bisacodyl 10 milliGRAM(s) Oral <User Schedule>  enoxaparin Injectable 40 milliGRAM(s) SubCutaneous every 24 hours  iron sucrose IVPB 200 milliGRAM(s) IV Intermittent every 24 hours  naproxen 500 milliGRAM(s) Oral two times a day  pantoprazole    Tablet 40 milliGRAM(s) Oral before breakfast  polyethylene glycol/electrolyte Solution 1000 milliLiter(s) Oral <User Schedule>    MEDICATIONS  (PRN):  acetaminophen     Tablet .. 650 milliGRAM(s) Oral every 6 hours PRN Temp greater or equal to 38.5C (101.3F), Mild Pain (1 - 3)  cyclobenzaprine 10 milliGRAM(s) Oral three times a day PRN Muscle Spasm      Vital Signs Last 24 Hrs  T(C): 36.7 (12 Jul 2023 11:40), Max: 37.1 (12 Jul 2023 04:44)  T(F): 98.1 (12 Jul 2023 11:40), Max: 98.7 (12 Jul 2023 04:44)  HR: 84 (12 Jul 2023 11:40) (67 - 84)  BP: 178/83 (12 Jul 2023 11:40) (126/74 - 178/83)  BP(mean): --  RR: 18 (12 Jul 2023 11:40) (18 - 18)  SpO2: 97% (12 Jul 2023 11:40) (94% - 97%)    Parameters below as of 12 Jul 2023 11:40  Patient On (Oxygen Delivery Method): room air        PHYSICAL EXAM:  GENERAL: NAD, well-developed  HEAD:  Atraumatic, Normocephalic  EYES: EOMI, PERRLA, conjunctiva and sclera clear  NECK: Supple, No JVD  CHEST/LUNG: Clear to auscultation bilaterally; No wheeze  HEART: Regular rate and rhythm; No murmurs, rubs, or gallops  ABDOMEN: Soft, Nontender, Nondistended; Bowel sounds present  EXTREMITIES:  2+ Peripheral Pulses, No clubbing, cyanosis, or edema  PSYCH: AAOx3  NEUROLOGY: non-focal  SKIN: No rashes or lesions    LABS:                        6.7    8.09  )-----------( 622      ( 12 Jul 2023 11:47 )             24.9                       RADIOLOGY & ADDITIONAL TESTS:    Imaging Personally Reviewed:    Consultant(s) Notes Reviewed:      Care Discussed with Consultants/Other Providers:

## 2023-07-12 NOTE — PROVIDER CONTACT NOTE (CRITICAL VALUE NOTIFICATION) - BACKGROUND
39 y/o female with Dx of Anemia, receiving iron transfusions.
39 y/o female admitted for anemia. Receiving Iron infusions

## 2023-07-12 NOTE — PROGRESS NOTE ADULT - ASSESSMENT
38-year-old female past medical history of remote anemia previously receiving iron transfusions here for lower back pain with some associated left arm numbness x1 week.  Noted to have severe iron deficiency anemia    MRI of back with multiple disc herniations - Neuro and Ortho following    #severe microcytic iron deficiency anemia  normal B12  Suspect largely 2/2 menorrhagia but need to evaluate GI tract to r/o occult lesion, sprue etc  r/o thalassemia or SS trait given degree of microcytosis    RECS:  -lengthy dw pt re: timing of EGD + Colonoscopy to evaluate severe iron deficiency anemia; now agreeable to undergo inpt EGD + Colon; will be easier to prep inpt given acute back pain issues/increased pain with ambulation and downtrending Hgb  -no overt/brisk GI bleed  -agree with PPI for GI ppx, especially i/s/o NSAIDs  -IV Iron per primary team  -follow up repeat CBC.   -If Hgb <7, please transfuse to optimize for anesthesia/procedure  -PO clears today and bowel prep (will order)  -NPO after MN except meds for EGD + Colonoscopy tomorrow  -await Hematology input  -consider pain management input    Discussed with pt  Discussed with Medicine team and attending  Discussed with Medicine    Galen Best PA-C    [facility]  [phone]  Available on TEAMS Mon-Fri 8a-4p  After hours and weekend coverage [phone]

## 2023-07-12 NOTE — PROGRESS NOTE ADULT - ASSESSMENT
38 f with    Back pain  - MR LS noted  - NSAID  - Flexeril  - PT  - Neurology evaluation noted    L arm and L leg numbness improving   - CT head noted    Anemia iron deficiency  - iron studies noted  - Iron supplementation  - GI evaluation  - EGD/ Colon pending   - Hematology evaluation pending     Hx of Methroragia  - Gyn follow as OTP      DVT prophylaxis    Davion Jefferson MD phone [phone]  38 f with    Back pain  - MR LS noted  - NSAID  - Flexeril  - PT  - Neurology evaluation noted    L arm and L leg numbness improving   - CT head noted    Anemia iron deficiency  - iron studies noted  - Iron supplementation  - transfuse 1 PRBC  - GI evaluation  - EGD/ Colon pending   - Hematology evaluation pending     Hx of Methroragia  - Gyn follow as OTP      DVT prophylaxis    Davion Jefferson MD phone [phone]

## 2023-07-12 NOTE — PROGRESS NOTE ADULT - SUBJECTIVE AND OBJECTIVE BOX
INTERVAL HPI/OVERNIGHT EVENTS:  BM yesterday - brown, formed  no abdominal pain, nausea or vomiting    no nausea or vomiting  still with back pain - increased pain with movement, ambulation  no CP, SOB, dizziness, YUSUF    MEDICATIONS  (STANDING):  ascorbic acid 500 milliGRAM(s) Oral daily  enoxaparin Injectable 40 milliGRAM(s) SubCutaneous every 24 hours  iron sucrose IVPB 200 milliGRAM(s) IV Intermittent every 24 hours  naproxen 500 milliGRAM(s) Oral two times a day  pantoprazole    Tablet 40 milliGRAM(s) Oral before breakfast    MEDICATIONS  (PRN):  acetaminophen     Tablet .. 650 milliGRAM(s) Oral every 6 hours PRN Temp greater or equal to 38.5C (101.3F), Mild Pain (1 - 3)  cyclobenzaprine 10 milliGRAM(s) Oral three times a day PRN Muscle Spasm      Allergies  No Known Allergies      Review of Systems:  see HPI- remainder 10 point ROS negative      Vital Signs Last 24 Hrs  T(C): 37.1 (12 Jul 2023 04:44), Max: 37.1 (12 Jul 2023 04:44)  T(F): 98.7 (12 Jul 2023 04:44), Max: 98.7 (12 Jul 2023 04:44)  HR: 67 (12 Jul 2023 04:44) (67 - 77)  BP: 126/74 (12 Jul 2023 04:44) (126/74 - 127/77)  BP(mean): --  RR: 18 (12 Jul 2023 04:44) (18 - 18)  SpO2: 94% (12 Jul 2023 04:44) (94% - 97%)    Parameters below as of 12 Jul 2023 04:44  Patient On (Oxygen Delivery Method): room air    PHYSICAL EXAM:    Constitutional: NAD, well-developed AA female sitting on chair near sink washing up  Neck: No LAD, supple no JVD  Respiratory: good air entry bl no accessory muscle use  Cardiovascular: S1 and S2, RRR  Gastrointestinal: BS+, soft, NT/ND, neg HSM  Extremities: No peripheral edema, neg clubbing, cyanosis  Vascular: 2+ peripheral pulses  Neurological: A/O x 3, no focal deficits sensation intact bl  Psychiatric: Normal mood, normal affect  Skin: No rashes +multiple tattoos    LABS:                        6.4    7.70  )-----------( 614      ( 12 Jul 2023 06:50 )             24.0               LIVER FUNCTIONS - ( 07 Jul 2023 14:44 )  Alb: 3.5 g/dL / Pro: 7.2 g/dL / ALK PHOS: 72 U/L / ALT: 16 U/L / AST: 56 U/L / GGT: x             RADIOLOGY & ADDITIONAL TESTS:

## 2023-07-13 ENCOUNTER — RESULT REVIEW (OUTPATIENT)
Age: 38
End: 2023-07-13

## 2023-07-13 LAB
ANION GAP SERPL CALC-SCNC: 9 MMOL/L — SIGNIFICANT CHANGE UP (ref 5–17)
BUN SERPL-MCNC: 6 MG/DL — LOW (ref 7–23)
CALCIUM SERPL-MCNC: 9.1 MG/DL — SIGNIFICANT CHANGE UP (ref 8.4–10.5)
CHLORIDE SERPL-SCNC: 103 MMOL/L — SIGNIFICANT CHANGE UP (ref 96–108)
CO2 SERPL-SCNC: 25 MMOL/L — SIGNIFICANT CHANGE UP (ref 22–31)
CREAT SERPL-MCNC: 0.63 MG/DL — SIGNIFICANT CHANGE UP (ref 0.5–1.3)
EGFR: 117 ML/MIN/1.73M2 — SIGNIFICANT CHANGE UP
GLUCOSE SERPL-MCNC: 90 MG/DL — SIGNIFICANT CHANGE UP (ref 70–99)
HCT VFR BLD CALC: 28.3 % — LOW (ref 34.5–45)
HEMOGLOBIN INTERPRETATION: SIGNIFICANT CHANGE UP
HGB A MFR BLD: 98.2 % — HIGH (ref 95.8–98)
HGB A2 MFR BLD: 1.8 % — LOW (ref 2–3.2)
HGB BLD-MCNC: 7.7 G/DL — LOW (ref 11.5–15.5)
MCHC RBC-ENTMCNC: 16.5 PG — LOW (ref 27–34)
MCHC RBC-ENTMCNC: 27.2 GM/DL — LOW (ref 32–36)
MCV RBC AUTO: 60.6 FL — LOW (ref 80–100)
NRBC # BLD: 0 /100 WBCS — SIGNIFICANT CHANGE UP (ref 0–0)
PLATELET # BLD AUTO: 594 K/UL — HIGH (ref 150–400)
POTASSIUM SERPL-MCNC: 4.3 MMOL/L — SIGNIFICANT CHANGE UP (ref 3.5–5.3)
POTASSIUM SERPL-SCNC: 4.3 MMOL/L — SIGNIFICANT CHANGE UP (ref 3.5–5.3)
RBC # BLD: 4.67 M/UL — SIGNIFICANT CHANGE UP (ref 3.8–5.2)
RBC # FLD: 26.5 % — HIGH (ref 10.3–14.5)
SODIUM SERPL-SCNC: 137 MMOL/L — SIGNIFICANT CHANGE UP (ref 135–145)
WBC # BLD: 9.23 K/UL — SIGNIFICANT CHANGE UP (ref 3.8–10.5)
WBC # FLD AUTO: 9.23 K/UL — SIGNIFICANT CHANGE UP (ref 3.8–10.5)

## 2023-07-13 PROCEDURE — 88305 TISSUE EXAM BY PATHOLOGIST: CPT | Mod: 26

## 2023-07-13 PROCEDURE — 99232 SBSQ HOSP IP/OBS MODERATE 35: CPT | Mod: 25

## 2023-07-13 PROCEDURE — 43239 EGD BIOPSY SINGLE/MULTIPLE: CPT

## 2023-07-13 PROCEDURE — 45378 DIAGNOSTIC COLONOSCOPY: CPT

## 2023-07-13 RX ADMIN — Medication 500 MILLIGRAM(S): at 18:32

## 2023-07-13 RX ADMIN — IRON SUCROSE 110 MILLIGRAM(S): 20 INJECTION, SOLUTION INTRAVENOUS at 18:33

## 2023-07-13 RX ADMIN — IRON SUCROSE 110 MILLIGRAM(S): 20 INJECTION, SOLUTION INTRAVENOUS at 04:06

## 2023-07-13 RX ADMIN — Medication 1000 MILLILITER(S): at 08:00

## 2023-07-13 NOTE — PROGRESS NOTE ADULT - SUBJECTIVE AND OBJECTIVE BOX
Patient is a 38y old  Female who presents with a chief complaint of Back Pain (11 Jul 2023 04:06)      SUBJECTIVE / OVERNIGHT EVENTS: Comfortable without new complaints. Back pain improving   Review of Systems  chest pain no  palpitations no  sob no  nausea no  headache no    MEDICATIONS  (STANDING):  ascorbic acid 500 milliGRAM(s) Oral daily  enoxaparin Injectable 40 milliGRAM(s) SubCutaneous every 24 hours  iron sucrose IVPB 200 milliGRAM(s) IV Intermittent every 24 hours  naproxen 500 milliGRAM(s) Oral two times a day  pantoprazole    Tablet 40 milliGRAM(s) Oral before breakfast    MEDICATIONS  (PRN):  acetaminophen     Tablet .. 650 milliGRAM(s) Oral every 6 hours PRN Temp greater or equal to 38.5C (101.3F), Mild Pain (1 - 3)  cyclobenzaprine 10 milliGRAM(s) Oral three times a day PRN Muscle Spasm      Vital Signs Last 24 Hrs  T(C): 36.3 (13 Jul 2023 14:47), Max: 36.9 (12 Jul 2023 21:09)  T(F): 97.4 (13 Jul 2023 14:13), Max: 98.5 (12 Jul 2023 21:09)  HR: 71 (13 Jul 2023 16:10) (67 - 92)  BP: 128/72 (13 Jul 2023 16:10) (114/76 - 144/83)  BP(mean): --  RR: 17 (13 Jul 2023 16:10) (14 - 20)  SpO2: 94% (13 Jul 2023 16:10) (92% - 99%)    Parameters below as of 13 Jul 2023 15:27  Patient On (Oxygen Delivery Method): room air        PHYSICAL EXAM:  GENERAL: NAD, well-developed  HEAD:  Atraumatic, Normocephalic  EYES: EOMI, PERRLA, conjunctiva and sclera clear  NECK: Supple, No JVD  CHEST/LUNG: Clear to auscultation bilaterally; No wheeze  HEART: Regular rate and rhythm; No murmurs, rubs, or gallops  ABDOMEN: Soft, Nontender, Nondistended; Bowel sounds present  EXTREMITIES:  2+ Peripheral Pulses, No clubbing, cyanosis, or edema  PSYCH: AAOx3  NEUROLOGY: non-focal  SKIN: No rashes or lesions    LABS:                        7.7    9.23  )-----------( 594      ( 13 Jul 2023 04:31 )             28.3     07-13    137  |  103  |  6<L>  ----------------------------<  90  4.3   |  25  |  0.63    Ca    9.1      13 Jul 2023 04:31            Urinalysis Basic - ( 13 Jul 2023 04:31 )    Color: x / Appearance: x / SG: x / pH: x  Gluc: 90 mg/dL / Ketone: x  / Bili: x / Urobili: x   Blood: x / Protein: x / Nitrite: x   Leuk Esterase: x / RBC: x / WBC x   Sq Epi: x / Non Sq Epi: x / Bacteria: x    < from: Upper Endoscopy (07.13.23 @ 14:45) >  Impression:          - Normal esophagus.                  - Z-line regular, 41 cm from the incisors.                       - Erythematous mucosa in the prepyloric region of the stomach.                       - Non-bleeding erosive gastropathy. Biopsied.                       - Normal examined duodenum. Biopsied.    < end of copied text >  < from: Colonoscopy (07.13.23 @ 14:44) >  Impression:          - The entire examined colon is normal.                       - No specimens collected.    < end of copied text >        RADIOLOGY & ADDITIONAL TESTS:    Imaging Personally Reviewed:    Consultant(s) Notes Reviewed:      Care Discussed with Consultants/Other Providers:

## 2023-07-13 NOTE — PROGRESS NOTE ADULT - ASSESSMENT
38 f with    Back pain  - MR LS noted  - NSAID  - Flexeril  - PT  - Neurology evaluation noted    L arm and L leg numbness improving   - CT head noted    Anemia iron deficiency  - iron studies noted  - Iron supplementation  - s/p transfusion 1 PRBC  - GI evaluation noted  - s/p EGD/ Colon    - Hematology evaluation pending     Gastritis  - PPI  - Path pending     Hx of Methroragia  - Gyn follow as OTP      DVT prophylaxis    DCP home.     Davion Jefferson MD phone [phone]

## 2023-07-13 NOTE — PRE PROCEDURE NOTE - PRE PROCEDURE EVALUATION
Attending Physician:   Dr Delgado                        Procedure: EGD/colonoscopy    Indication for Procedure: Anemia  ________________________________________________________  PAST MEDICAL & SURGICAL HISTORY:  Anemia of pregnancy  resolved      Multiparity      History of anemia      H/O tubal ligation  2016        ALLERGIES:  No Known Allergies    HOME MEDICATIONS:  cyclobenzaprine 10 mg oral tablet: 1 tab(s) orally once a day (at bedtime)  ibuprofen 600 mg oral tablet: 1 tab(s) orally every 6 hours  methocarbamol 750 mg oral tablet: 1 tab(s) orally 3 times a day  oxyCODONE 5 mg oral tablet: 1 tab(s) orally 3 times a day as needed    AICD/PPM: [ ] yes   [x ] no    PERTINENT LAB DATA:                        7.7    9.23  )-----------( 594      ( 13 Jul 2023 04:31 )             28.3     07-13    137  |  103  |  6<L>  ----------------------------<  90  4.3   |  25  |  0.63    Ca    9.1      13 Jul 2023 04:31                  PHYSICAL EXAMINATION:    T(C): 36.8  HR: 72  BP: 119/66  RR: 18  SpO2: 94%    Constitutional: NAD    Neck:  No JVD  Respiratory: no respiratory distress   Cardiovascular: RRR  Extremities: No peripheral edema  Neurological: A/O x 3, no focal deficits        COMMENTS:    The patient is a suitable candidate for the planned procedure unless box checked [ ]  No, explain:

## 2023-07-13 NOTE — PRE PROCEDURE NOTE - PROCEDURE SERVICE
November 3, 2022     Patient: Mik Doe   YOB: 1953   Date of Visit: 11/3/2022       To Whom it May Concern:    Please excuse Mik from work yesterday, 11/2/22, due to medical reasons.    Sincerely,         Raulito Kline, DO    Medical information is confidential and cannot be disclosed without the written consent of the patient or her representative.       Endoscopy

## 2023-07-14 ENCOUNTER — TRANSCRIPTION ENCOUNTER (OUTPATIENT)
Age: 38
End: 2023-07-14

## 2023-07-14 LAB
HCT VFR BLD CALC: 29.9 % — LOW (ref 34.5–45)
HGB BLD-MCNC: 8.2 G/DL — LOW (ref 11.5–15.5)
MCHC RBC-ENTMCNC: 16.9 PG — LOW (ref 27–34)
MCHC RBC-ENTMCNC: 27.4 GM/DL — LOW (ref 32–36)
MCV RBC AUTO: 61.5 FL — LOW (ref 80–100)
NRBC # BLD: 0 /100 WBCS — SIGNIFICANT CHANGE UP (ref 0–0)
PLATELET # BLD AUTO: 558 K/UL — HIGH (ref 150–400)
RBC # BLD: 4.86 M/UL — SIGNIFICANT CHANGE UP (ref 3.8–5.2)
RBC # FLD: 26.7 % — HIGH (ref 10.3–14.5)
WBC # BLD: 11.2 K/UL — HIGH (ref 3.8–10.5)
WBC # FLD AUTO: 11.2 K/UL — HIGH (ref 3.8–10.5)

## 2023-07-14 PROCEDURE — 99232 SBSQ HOSP IP/OBS MODERATE 35: CPT

## 2023-07-14 RX ORDER — ASCORBIC ACID 60 MG
1 TABLET,CHEWABLE ORAL
Qty: 30 | Refills: 0
Start: 2023-07-14 | End: 2023-08-12

## 2023-07-14 RX ORDER — PANTOPRAZOLE SODIUM 20 MG/1
1 TABLET, DELAYED RELEASE ORAL
Qty: 30 | Refills: 0
Start: 2023-07-14 | End: 2023-08-12

## 2023-07-14 RX ORDER — CYCLOBENZAPRINE HYDROCHLORIDE 10 MG/1
1 TABLET, FILM COATED ORAL
Qty: 9 | Refills: 0
Start: 2023-07-14 | End: 2023-07-16

## 2023-07-14 RX ADMIN — IRON SUCROSE 110 MILLIGRAM(S): 20 INJECTION, SOLUTION INTRAVENOUS at 19:07

## 2023-07-14 RX ADMIN — PANTOPRAZOLE SODIUM 40 MILLIGRAM(S): 20 TABLET, DELAYED RELEASE ORAL at 05:34

## 2023-07-14 RX ADMIN — ENOXAPARIN SODIUM 40 MILLIGRAM(S): 100 INJECTION SUBCUTANEOUS at 05:34

## 2023-07-14 RX ADMIN — Medication 500 MILLIGRAM(S): at 05:34

## 2023-07-14 RX ADMIN — Medication 650 MILLIGRAM(S): at 11:50

## 2023-07-14 RX ADMIN — Medication 500 MILLIGRAM(S): at 17:38

## 2023-07-14 RX ADMIN — Medication 650 MILLIGRAM(S): at 11:11

## 2023-07-14 RX ADMIN — Medication 500 MILLIGRAM(S): at 11:11

## 2023-07-14 NOTE — PROGRESS NOTE ADULT - ASSESSMENT
38-year-old female past medical history of remote anemia previously receiving iron transfusions here for lower back pain with some associated left arm numbness x1 week.  Noted to have severe iron deficiency anemia    MRI of back with multiple disc herniations - Neuro and Ortho following    #severe microcytic iron deficiency anemia  normal B12  Suspect largely 2/2 menorrhagia but need to evaluate GI tract to r/o occult lesion, sprue etc  r/o thalassemia or SS trait given degree of microcytosis  sp 1 unit PRBCs 7/12, IV Iron (planned rx x 5days)  EGD 7/13/23: normal esophagus, nonbleeding gastric erosions and gastritis (bx), normal duodenum (bx)  COLON 7/13/23: normal    RECS:  -PO PPI daily  -follow up biopsy results as outpt in 2 weeks  -outp VCE to complete GI work-up of iron deficiency anemia    Office card given to pt and will have  call pt to schedule  Follow up with Dr ANA Delgado  [address] [place] [address]  [phone]    Discussed with pt  Discussed with Medicine team and attending  no GI objection to DC planning with outpt follow up as outlined above  Please call GI service over weekend prn with acute GI concerns if pt remains hospitalized  GI service : [phone]      Galen Best PA-C    [facility]  [phone]  Available on TEAMS Mon-Fri 8a-4p  After hours and weekend coverage [phone]

## 2023-07-14 NOTE — PROGRESS NOTE ADULT - SUBJECTIVE AND OBJECTIVE BOX
Patient is a 38y old  Female who presents with a chief complaint of Back Pain (11 Jul 2023 04:06)      SUBJECTIVE / OVERNIGHT EVENTS: c/o pain L groin   Review of Systems  chest pain no  palpitations no  sob no  nausea no  headache no    MEDICATIONS  (STANDING):  ascorbic acid 500 milliGRAM(s) Oral daily  enoxaparin Injectable 40 milliGRAM(s) SubCutaneous every 24 hours  iron sucrose IVPB 200 milliGRAM(s) IV Intermittent every 24 hours  naproxen 500 milliGRAM(s) Oral two times a day  pantoprazole    Tablet 40 milliGRAM(s) Oral before breakfast    MEDICATIONS  (PRN):  acetaminophen     Tablet .. 650 milliGRAM(s) Oral every 6 hours PRN Temp greater or equal to 38.5C (101.3F), Mild Pain (1 - 3)  cyclobenzaprine 10 milliGRAM(s) Oral three times a day PRN Muscle Spasm      Vital Signs Last 24 Hrs  T(C): 36.8 (14 Jul 2023 12:53), Max: 36.8 (14 Jul 2023 12:53)  T(F): 98.2 (14 Jul 2023 12:53), Max: 98.2 (14 Jul 2023 12:53)  HR: 83 (14 Jul 2023 12:53) (74 - 83)  BP: 142/93 (14 Jul 2023 12:53) (114/72 - 145/87)  BP(mean): --  RR: 18 (14 Jul 2023 12:53) (18 - 18)  SpO2: 98% (14 Jul 2023 12:53) (95% - 98%)    Parameters below as of 14 Jul 2023 12:53  Patient On (Oxygen Delivery Method): room air        PHYSICAL EXAM:  GENERAL: NAD, well-developed  HEAD:  Atraumatic, Normocephalic  EYES: EOMI, PERRLA, conjunctiva and sclera clear  NECK: Supple, No JVD  CHEST/LUNG: Clear to auscultation bilaterally; No wheeze  HEART: Regular rate and rhythm; No murmurs, rubs, or gallops  ABDOMEN: Soft, Nontender, Nondistended; Bowel sounds present L groin tender.   EXTREMITIES:  2+ Peripheral Pulses, No clubbing, cyanosis, or edema  PSYCH: AAOx3  NEUROLOGY: non-focal  SKIN: No rashes or lesions    LABS:                        8.2    11.20 )-----------( 558      ( 14 Jul 2023 06:45 )             29.9     07-13    137  |  103  |  6<L>  ----------------------------<  90  4.3   |  25  |  0.63    Ca    9.1      13 Jul 2023 04:31            Urinalysis Basic - ( 13 Jul 2023 04:31 )    Color: x / Appearance: x / SG: x / pH: x  Gluc: 90 mg/dL / Ketone: x  / Bili: x / Urobili: x   Blood: x / Protein: x / Nitrite: x   Leuk Esterase: x / RBC: x / WBC x   Sq Epi: x / Non Sq Epi: x / Bacteria: x          RADIOLOGY & ADDITIONAL TESTS:    Imaging Personally Reviewed:    Consultant(s) Notes Reviewed:      Care Discussed with Consultants/Other Providers:

## 2023-07-14 NOTE — PROGRESS NOTE ADULT - ASSESSMENT
38 f with    Abdominal pain/ L groin pain  - CT abdomen/pelvis  - Surgical evaluation     Back pain  - MR LS noted  - NSAID  - Flexeril  - PT  - Neurology evaluation noted    L arm and L leg numbness improving   - CT head noted    Anemia iron deficiency  - iron studies noted  - Iron supplementation  - s/p transfusion 1 PRBC  - GI evaluation noted  - s/p EGD/ Colon    - Hematology evaluation pending     Gastritis  - PPI  - Path pending     Hx of Methroragia  - Gyn follow as OTP      DVT prophylaxis    DCP home.     Davion Jefferson MD phone [phone]

## 2023-07-14 NOTE — CHART NOTE - NSCHARTNOTEFT_GEN_A_CORE
Patient requires a bedside commode for d/c home due to patient is at risk to fall and cannot walk safely to the bathroom.    Ariella BRANDT-BC
Due to condition back pain,, patient has a severe mobility limitation and requires a standard wheelchair to assist in daily ADLS. Patient has sufficient upper body strength to self propel said wheelchair and has not expressed an unwillingness to use the wheelchair. patient has ample room in the home and a caregiver to assist with the wheelchair, Use of a manual wheelchair will significantly improve the patients ability to participate in daily ADL's and the patient will use it on a regular basis in the home    Ariella MCKOY
Imaging reviewed by Neurology. Symptoms likely due to spinal pathology which is being followed by Orthopedics  At this time, based on CT head and MRI spine imaging, no need seen for MR Brain imaging.  Patient can follow up with general neurology at [address] [place] [address] 1-2 weeks after discharge. Please instruct the patient to call [phone] to schedule this appointment.

## 2023-07-14 NOTE — PROGRESS NOTE ADULT - SUBJECTIVE AND OBJECTIVE BOX
INTERVAL HPI/OVERNIGHT EVENTS:  sp EGD + colonoscopy yesterday  no abdominal pain, nausea or vomiting  no fever or chills  working with PT today  still with back pain     no fever or chills  no dizziness or syncope  no CP or SOB  no YUSUF    MEDICATIONS  (STANDING):  ascorbic acid 500 milliGRAM(s) Oral daily  enoxaparin Injectable 40 milliGRAM(s) SubCutaneous every 24 hours  iron sucrose IVPB 200 milliGRAM(s) IV Intermittent every 24 hours  naproxen 500 milliGRAM(s) Oral two times a day  pantoprazole    Tablet 40 milliGRAM(s) Oral before breakfast    MEDICATIONS  (PRN):  acetaminophen     Tablet .. 650 milliGRAM(s) Oral every 6 hours PRN Temp greater or equal to 38.5C (101.3F), Mild Pain (1 - 3)  cyclobenzaprine 10 milliGRAM(s) Oral three times a day PRN Muscle Spasm      Allergies  No Known Allergies      Review of Systems:  see HPI- remainder 10 point ROS negative      Vital Signs Last 24 Hrs  T(C): 36.7 (14 Jul 2023 04:24), Max: 36.7 (14 Jul 2023 04:24)  T(F): 98.1 (14 Jul 2023 04:24), Max: 98.1 (14 Jul 2023 04:24)  HR: 74 (14 Jul 2023 04:24) (71 - 92)  BP: 114/72 (14 Jul 2023 04:24) (114/72 - 145/87)  BP(mean): --  RR: 18 (14 Jul 2023 04:24) (14 - 20)  SpO2: 95% (14 Jul 2023 04:24) (92% - 99%)    Parameters below as of 14 Jul 2023 04:24  Patient On (Oxygen Delivery Method): room air    PHYSICAL EXAM:  Constitutional: NAD, well-developed AA female sitting in chair; working with PT  Neck: No LAD, supple no JVD  Respiratory: good air entry bl no accessory muscle use  Cardiovascular: S1 and S2, RRR  Gastrointestinal: BS+, soft, NT/ND, neg HSM  Extremities: No peripheral edema, neg clubbing, cyanosis  Vascular: 2+ peripheral pulses  Neurological: A/O x 3, no focal deficits sensation intact bl  Psychiatric: Normal mood, normal affect  Skin: No rashes +multiple tattoos    LABS:                        8.2    11.20 )-----------( 558      ( 14 Jul 2023 06:45 )             29.9     07-13    137  |  103  |  6<L>  ----------------------------<  90  4.3   |  25  |  0.63    Ca    9.1      13 Jul 2023 04:31          RADIOLOGY & ADDITIONAL TESTS:

## 2023-07-15 PROCEDURE — 74176 CT ABD & PELVIS W/O CONTRAST: CPT | Mod: 26

## 2023-07-15 RX ADMIN — ENOXAPARIN SODIUM 40 MILLIGRAM(S): 100 INJECTION SUBCUTANEOUS at 05:18

## 2023-07-15 RX ADMIN — Medication 500 MILLIGRAM(S): at 06:10

## 2023-07-15 RX ADMIN — Medication 500 MILLIGRAM(S): at 17:10

## 2023-07-15 RX ADMIN — Medication 500 MILLIGRAM(S): at 11:55

## 2023-07-15 RX ADMIN — PANTOPRAZOLE SODIUM 40 MILLIGRAM(S): 20 TABLET, DELAYED RELEASE ORAL at 05:18

## 2023-07-15 RX ADMIN — Medication 500 MILLIGRAM(S): at 05:17

## 2023-07-15 NOTE — PROGRESS NOTE ADULT - SUBJECTIVE AND OBJECTIVE BOX
Patient is a 38y old  Female who presents with a chief complaint of Back Pain (11 Jul 2023 04:06)      SUBJECTIVE / OVERNIGHT EVENTS: No new complaints.   Review of Systems  chest pain no  palpitations no  sob no  nausea no  headache no    MEDICATIONS  (STANDING):  ascorbic acid 500 milliGRAM(s) Oral daily  enoxaparin Injectable 40 milliGRAM(s) SubCutaneous every 24 hours  iron sucrose IVPB 200 milliGRAM(s) IV Intermittent every 24 hours  naproxen 500 milliGRAM(s) Oral two times a day  pantoprazole    Tablet 40 milliGRAM(s) Oral before breakfast    MEDICATIONS  (PRN):  acetaminophen     Tablet .. 650 milliGRAM(s) Oral every 6 hours PRN Temp greater or equal to 38.5C (101.3F), Mild Pain (1 - 3)  cyclobenzaprine 10 milliGRAM(s) Oral three times a day PRN Muscle Spasm      Vital Signs Last 24 Hrs  T(C): 36.8 (15 Jul 2023 13:45), Max: 36.8 (14 Jul 2023 22:21)  T(F): 98.2 (15 Jul 2023 13:45), Max: 98.2 (14 Jul 2023 22:21)  HR: 74 (15 Jul 2023 13:45) (67 - 74)  BP: 148/77 (15 Jul 2023 13:45) (107/71 - 148/77)  BP(mean): --  RR: 18 (15 Jul 2023 13:45) (18 - 18)  SpO2: 97% (15 Jul 2023 13:45) (96% - 97%)    Parameters below as of 15 Jul 2023 13:45  Patient On (Oxygen Delivery Method): room air        PHYSICAL EXAM:  GENERAL: NAD, well-developed  HEAD:  Atraumatic, Normocephalic  EYES: EOMI, PERRLA, conjunctiva and sclera clear  NECK: Supple, No JVD  CHEST/LUNG: Clear to auscultation bilaterally; No wheeze  HEART: Regular rate and rhythm; No murmurs, rubs, or gallops  ABDOMEN: Soft, L groin tender, Nondistended; Bowel sounds present  EXTREMITIES:  2+ Peripheral Pulses, No clubbing, cyanosis, or edema  PSYCH: AAOx3  NEUROLOGY: non-focal  SKIN: No rashes or lesions    LABS:                        8.2    11.20 )-----------( 558      ( 14 Jul 2023 06:45 )             29.9                       RADIOLOGY & ADDITIONAL TESTS:    Imaging Personally Reviewed:    Consultant(s) Notes Reviewed:      Care Discussed with Consultants/Other Providers:

## 2023-07-15 NOTE — PROGRESS NOTE ADULT - ASSESSMENT
38 f with    Abdominal pain/ L groin pain  - CT abdomen/pelvis pending   - Surgical evaluation if above abnormal    Back pain  - MR LS noted  - NSAID  - Flexeril  - PT  - Neurology evaluation noted    L arm and L leg numbness improving   - CT head noted    Anemia iron deficiency  - iron studies noted  - Iron supplementation  - s/p transfusion 1 PRBC  - GI evaluation noted  - s/p EGD/ Colon    - Hematology evaluation pending     Gastritis  - PPI  - Path pending     Hx of Methroragia  - Gyn follow as OTP      DVT prophylaxis    DCP home.     Davion Jefferson MD phone [phone]

## 2023-07-16 ENCOUNTER — TRANSCRIPTION ENCOUNTER (OUTPATIENT)
Age: 38
End: 2023-07-16

## 2023-07-16 VITALS
RESPIRATION RATE: 18 BRPM | TEMPERATURE: 98 F | SYSTOLIC BLOOD PRESSURE: 135 MMHG | OXYGEN SATURATION: 97 % | HEART RATE: 77 BPM | DIASTOLIC BLOOD PRESSURE: 89 MMHG

## 2023-07-16 LAB
ANION GAP SERPL CALC-SCNC: 13 MMOL/L — SIGNIFICANT CHANGE UP (ref 5–17)
BLD GP AB SCN SERPL QL: NEGATIVE — SIGNIFICANT CHANGE UP
BUN SERPL-MCNC: 10 MG/DL — SIGNIFICANT CHANGE UP (ref 7–23)
CALCIUM SERPL-MCNC: 8.9 MG/DL — SIGNIFICANT CHANGE UP (ref 8.4–10.5)
CHLORIDE SERPL-SCNC: 103 MMOL/L — SIGNIFICANT CHANGE UP (ref 96–108)
CO2 SERPL-SCNC: 24 MMOL/L — SIGNIFICANT CHANGE UP (ref 22–31)
CREAT SERPL-MCNC: 0.52 MG/DL — SIGNIFICANT CHANGE UP (ref 0.5–1.3)
EGFR: 123 ML/MIN/1.73M2 — SIGNIFICANT CHANGE UP
GLUCOSE SERPL-MCNC: 110 MG/DL — HIGH (ref 70–99)
HCT VFR BLD CALC: 30.5 % — LOW (ref 34.5–45)
HGB BLD-MCNC: 8.4 G/DL — LOW (ref 11.5–15.5)
MAGNESIUM SERPL-MCNC: 2.2 MG/DL — SIGNIFICANT CHANGE UP (ref 1.6–2.6)
MCHC RBC-ENTMCNC: 17.5 PG — LOW (ref 27–34)
MCHC RBC-ENTMCNC: 27.5 GM/DL — LOW (ref 32–36)
MCV RBC AUTO: 63.4 FL — LOW (ref 80–100)
NRBC # BLD: 0 /100 WBCS — SIGNIFICANT CHANGE UP (ref 0–0)
PLATELET # BLD AUTO: 419 K/UL — HIGH (ref 150–400)
POTASSIUM SERPL-MCNC: 3.4 MMOL/L — LOW (ref 3.5–5.3)
POTASSIUM SERPL-SCNC: 3.4 MMOL/L — LOW (ref 3.5–5.3)
RBC # BLD: 4.81 M/UL — SIGNIFICANT CHANGE UP (ref 3.8–5.2)
RBC # FLD: 29.7 % — HIGH (ref 10.3–14.5)
RH IG SCN BLD-IMP: POSITIVE — SIGNIFICANT CHANGE UP
SODIUM SERPL-SCNC: 140 MMOL/L — SIGNIFICANT CHANGE UP (ref 135–145)
WBC # BLD: 9.88 K/UL — SIGNIFICANT CHANGE UP (ref 3.8–10.5)
WBC # FLD AUTO: 9.88 K/UL — SIGNIFICANT CHANGE UP (ref 3.8–10.5)

## 2023-07-16 PROCEDURE — 97530 THERAPEUTIC ACTIVITIES: CPT

## 2023-07-16 PROCEDURE — 72141 MRI NECK SPINE W/O DYE: CPT

## 2023-07-16 PROCEDURE — 86900 BLOOD TYPING SEROLOGIC ABO: CPT

## 2023-07-16 PROCEDURE — 96374 THER/PROPH/DIAG INJ IV PUSH: CPT

## 2023-07-16 PROCEDURE — 86140 C-REACTIVE PROTEIN: CPT

## 2023-07-16 PROCEDURE — 86850 RBC ANTIBODY SCREEN: CPT

## 2023-07-16 PROCEDURE — 88305 TISSUE EXAM BY PATHOLOGIST: CPT

## 2023-07-16 PROCEDURE — 83540 ASSAY OF IRON: CPT

## 2023-07-16 PROCEDURE — 85652 RBC SED RATE AUTOMATED: CPT

## 2023-07-16 PROCEDURE — A9585: CPT

## 2023-07-16 PROCEDURE — 74176 CT ABD & PELVIS W/O CONTRAST: CPT

## 2023-07-16 PROCEDURE — 84702 CHORIONIC GONADOTROPIN TEST: CPT

## 2023-07-16 PROCEDURE — 80053 COMPREHEN METABOLIC PANEL: CPT

## 2023-07-16 PROCEDURE — 70450 CT HEAD/BRAIN W/O DYE: CPT | Mod: MA

## 2023-07-16 PROCEDURE — 36415 COLL VENOUS BLD VENIPUNCTURE: CPT

## 2023-07-16 PROCEDURE — 97162 PT EVAL MOD COMPLEX 30 MIN: CPT

## 2023-07-16 PROCEDURE — 72148 MRI LUMBAR SPINE W/O DYE: CPT

## 2023-07-16 PROCEDURE — 99285 EMERGENCY DEPT VISIT HI MDM: CPT | Mod: 25

## 2023-07-16 PROCEDURE — 83735 ASSAY OF MAGNESIUM: CPT

## 2023-07-16 PROCEDURE — 86922 COMPATIBILITY TEST ANTIGLOB: CPT

## 2023-07-16 PROCEDURE — 72131 CT LUMBAR SPINE W/O DYE: CPT | Mod: MD

## 2023-07-16 PROCEDURE — 72128 CT CHEST SPINE W/O DYE: CPT | Mod: MD

## 2023-07-16 PROCEDURE — 80048 BASIC METABOLIC PNL TOTAL CA: CPT

## 2023-07-16 PROCEDURE — 83550 IRON BINDING TEST: CPT

## 2023-07-16 PROCEDURE — 82728 ASSAY OF FERRITIN: CPT

## 2023-07-16 PROCEDURE — 86901 BLOOD TYPING SEROLOGIC RH(D): CPT

## 2023-07-16 PROCEDURE — 97116 GAIT TRAINING THERAPY: CPT

## 2023-07-16 PROCEDURE — 70544 MR ANGIOGRAPHY HEAD W/O DYE: CPT

## 2023-07-16 PROCEDURE — 36430 TRANSFUSION BLD/BLD COMPNT: CPT

## 2023-07-16 PROCEDURE — 85027 COMPLETE CBC AUTOMATED: CPT

## 2023-07-16 PROCEDURE — 83020 HEMOGLOBIN ELECTROPHORESIS: CPT

## 2023-07-16 PROCEDURE — 72125 CT NECK SPINE W/O DYE: CPT | Mod: MD

## 2023-07-16 PROCEDURE — 96375 TX/PRO/DX INJ NEW DRUG ADDON: CPT

## 2023-07-16 PROCEDURE — 70553 MRI BRAIN STEM W/O & W/DYE: CPT

## 2023-07-16 PROCEDURE — 85025 COMPLETE CBC W/AUTO DIFF WBC: CPT

## 2023-07-16 PROCEDURE — 70549 MR ANGIOGRAPH NECK W/O&W/DYE: CPT

## 2023-07-16 PROCEDURE — 73501 X-RAY EXAM HIP UNI 1 VIEW: CPT

## 2023-07-16 PROCEDURE — 82607 VITAMIN B-12: CPT

## 2023-07-16 PROCEDURE — P9040: CPT

## 2023-07-16 RX ORDER — CYCLOBENZAPRINE HYDROCHLORIDE 10 MG/1
1 TABLET, FILM COATED ORAL
Refills: 0 | DISCHARGE

## 2023-07-16 RX ORDER — METHOCARBAMOL 500 MG/1
1 TABLET, FILM COATED ORAL
Refills: 0 | DISCHARGE

## 2023-07-16 RX ORDER — POTASSIUM CHLORIDE 20 MEQ
40 PACKET (EA) ORAL ONCE
Refills: 0 | Status: COMPLETED | OUTPATIENT
Start: 2023-07-16 | End: 2023-07-16

## 2023-07-16 RX ORDER — OXYCODONE HYDROCHLORIDE 5 MG/1
1 TABLET ORAL
Refills: 0 | DISCHARGE

## 2023-07-16 RX ORDER — FERROUS SULFATE 325(65) MG
325 TABLET ORAL DAILY
Refills: 0 | Status: DISCONTINUED | OUTPATIENT
Start: 2023-07-16 | End: 2023-07-16

## 2023-07-16 RX ORDER — FERROUS SULFATE 325(65) MG
1 TABLET ORAL
Qty: 30 | Refills: 0
Start: 2023-07-16 | End: 2023-08-14

## 2023-07-16 RX ADMIN — ENOXAPARIN SODIUM 40 MILLIGRAM(S): 100 INJECTION SUBCUTANEOUS at 05:13

## 2023-07-16 RX ADMIN — IRON SUCROSE 110 MILLIGRAM(S): 20 INJECTION, SOLUTION INTRAVENOUS at 01:20

## 2023-07-16 RX ADMIN — Medication 500 MILLIGRAM(S): at 05:14

## 2023-07-16 RX ADMIN — Medication 500 MILLIGRAM(S): at 10:58

## 2023-07-16 RX ADMIN — Medication 40 MILLIEQUIVALENT(S): at 10:58

## 2023-07-16 RX ADMIN — PANTOPRAZOLE SODIUM 40 MILLIGRAM(S): 20 TABLET, DELAYED RELEASE ORAL at 05:14

## 2023-07-16 NOTE — DISCHARGE NOTE NURSING/CASE MANAGEMENT/SOCIAL WORK - PATIENT PORTAL LINK FT
You can access the FollowMyHealth Patient Portal offered by [facility] by registering at the following website: [email or web address]. By joining [facility]’s FollowMyHealth portal, you will also be able to view your health information using other applications (apps) compatible with our system.
You can access the FollowMyHealth Patient Portal offered by [facility] by registering at the following website: [email or web address]. By joining [facility]’s FollowMyHealth portal, you will also be able to view your health information using other applications (apps) compatible with our system.

## 2023-07-16 NOTE — DISCHARGE NOTE NURSING/CASE MANAGEMENT/SOCIAL WORK - NSDCFUADDAPPT_GEN_ALL_CORE_FT
APPTS ARE READY TO BE MADE: [ x] YES    Best Family or Patient Contact (if needed):    Additional Information about above appointments (if needed):    1: Follow up with Dr Delgado in 3 weeks  2:   3:     Other comments or requests:   
APPTS ARE READY TO BE MADE: [ x] YES    Best Family or Patient Contact (if needed):    Additional Information about above appointments (if needed):    1: Follow up with Dr Delgado in 3 weeks  2: Follow up with outpt GYN Dr. Hernandez in 2-3 weeks   3:     Other comments or requests:

## 2023-07-16 NOTE — PROGRESS NOTE ADULT - ASSESSMENT
38 f with    Abdominal pain/ L groin pain  - CT abdomen/pelvis noted  - probably muscle skeletal pain    Back pain  - MR LS noted  - NSAID  - Flexeril  - PT  - Neurology evaluation noted     L arm and L leg numbness improving   - CT head noted    Anemia iron deficiency  - iron studies noted  - Iron supplementation  - s/p transfusion 1 PRBC  - GI evaluation noted  - s/p EGD/ Colon    - Hematology evaluation as OTP    Gastritis  - PPI  - Path pending     Hx of Methroragia  - Gyn follow as OTP      DVT prophylaxis    DC home. Follow with PMD/ Gyn/ Gastroenterology/ Hematology/ Ortho in 1 week. QA      Davion Jefferson MD phone [phone]

## 2023-07-16 NOTE — DISCHARGE NOTE NURSING/CASE MANAGEMENT/SOCIAL WORK - NSDCPEFALRISK_GEN_ALL_CORE
For information on Fall & Injury Prevention, visit: [email or web address] OR  [email or web address] OR  https://www.cdc.gov/steadi/patient.html
For information on Fall & Injury Prevention, visit: [email or web address]/[email or web address] OR  [email or web address]/[email or web address] OR  https://www.cdc.gov/steadi/patient.html

## 2023-07-16 NOTE — PROGRESS NOTE ADULT - PROVIDER SPECIALTY LIST ADULT
Internal Medicine
Orthopedics
Orthopedics
Gastroenterology
Gastroenterology
Internal Medicine
Orthopedics
Internal Medicine

## 2023-07-16 NOTE — PROGRESS NOTE ADULT - SUBJECTIVE AND OBJECTIVE BOX
Patient is a 38y old  Female who presents with a chief complaint of Back Pain (11 Jul 2023 04:06)      SUBJECTIVE / OVERNIGHT EVENTS: Comfortable without new complaints.   Review of Systems  chest pain no  palpitations no  sob no  nausea no  headache no    MEDICATIONS  (STANDING):  ascorbic acid 500 milliGRAM(s) Oral daily  enoxaparin Injectable 40 milliGRAM(s) SubCutaneous every 24 hours  ferrous    sulfate 325 milliGRAM(s) Oral daily  naproxen 500 milliGRAM(s) Oral two times a day  pantoprazole    Tablet 40 milliGRAM(s) Oral before breakfast    MEDICATIONS  (PRN):  acetaminophen     Tablet .. 650 milliGRAM(s) Oral every 6 hours PRN Temp greater or equal to 38.5C (101.3F), Mild Pain (1 - 3)  cyclobenzaprine 10 milliGRAM(s) Oral three times a day PRN Muscle Spasm      Vital Signs Last 24 Hrs  T(C): 36.6 (16 Jul 2023 05:18), Max: 36.8 (15 Jul 2023 13:45)  T(F): 97.9 (16 Jul 2023 05:18), Max: 98.2 (15 Jul 2023 13:45)  HR: 74 (16 Jul 2023 05:18) (72 - 74)  BP: 143/88 (16 Jul 2023 05:18) (127/80 - 148/77)  BP(mean): --  RR: 18 (16 Jul 2023 05:18) (18 - 18)  SpO2: 96% (16 Jul 2023 05:18) (96% - 97%)    Parameters below as of 16 Jul 2023 05:18  Patient On (Oxygen Delivery Method): room air        PHYSICAL EXAM:  GENERAL: NAD, well-developed  HEAD:  Atraumatic, Normocephalic  EYES: EOMI, PERRLA, conjunctiva and sclera clear  NECK: Supple, No JVD  CHEST/LUNG: Clear to auscultation bilaterally; No wheeze  HEART: Regular rate and rhythm; No murmurs, rubs, or gallops  ABDOMEN: Soft, Nontender, Nondistended; Bowel sounds present  EXTREMITIES:  2+ Peripheral Pulses, No clubbing, cyanosis, or edema  PSYCH: AAOx3  NEUROLOGY: non-focal  SKIN: No rashes or lesions    LABS:                        8.4    9.88  )-----------( 419      ( 16 Jul 2023 06:47 )             30.5     07-16    140  |  103  |  10  ----------------------------<  110<H>  3.4<L>   |  24  |  0.52    Ca    8.9      16 Jul 2023 06:46  Mg     2.2     07-16            Urinalysis Basic - ( 16 Jul 2023 06:46 )    Color: x / Appearance: x / SG: x / pH: x  Gluc: 110 mg/dL / Ketone: x  / Bili: x / Urobili: x   Blood: x / Protein: x / Nitrite: x   Leuk Esterase: x / RBC: x / WBC x   Sq Epi: x / Non Sq Epi: x / Bacteria: x          RADIOLOGY & ADDITIONAL TESTS:    Imaging Personally Reviewed:  < from: CT Abdomen and Pelvis No Cont (07.15.23 @ 16:43) >  IMPRESSION:  No acute abdominopelvic findings. No CT findings to explain the patient's   left groin pain.    Small right pleural effusion.    < end of copied text >    Consultant(s) Notes Reviewed:      Care Discussed with Consultants/Other Providers:

## 2023-07-17 PROBLEM — Z86.2 PERSONAL HISTORY OF DISEASES OF THE BLOOD AND BLOOD-FORMING ORGANS AND CERTAIN DISORDERS INVOLVING THE IMMUNE MECHANISM: Chronic | Status: ACTIVE | Noted: 2023-07-07

## 2023-07-20 ENCOUNTER — APPOINTMENT (OUTPATIENT)
Dept: NEUROLOGY | Facility: CLINIC | Age: 38
End: 2023-07-20
Payer: COMMERCIAL

## 2023-07-20 VITALS
HEIGHT: 66 IN | SYSTOLIC BLOOD PRESSURE: 128 MMHG | WEIGHT: 230 LBS | HEART RATE: 81 BPM | OXYGEN SATURATION: 98 % | TEMPERATURE: 98.1 F | DIASTOLIC BLOOD PRESSURE: 80 MMHG | BODY MASS INDEX: 36.96 KG/M2

## 2023-07-20 DIAGNOSIS — M54.16 RADICULOPATHY, LUMBAR REGION: ICD-10-CM

## 2023-07-20 DIAGNOSIS — M54.12 RADICULOPATHY, CERVICAL REGION: ICD-10-CM

## 2023-07-20 PROCEDURE — 99205 OFFICE O/P NEW HI 60 MIN: CPT

## 2023-07-20 RX ORDER — METRONIDAZOLE 500 MG/1
500 TABLET ORAL TWICE DAILY
Qty: 14 | Refills: 1 | Status: DISCONTINUED | COMMUNITY
Start: 2022-12-12 | End: 2023-07-20

## 2023-07-20 RX ORDER — NYSTATIN AND TRIAMCINOLONE ACETONIDE 100000; 1 [USP'U]/G; MG/G
100000-0.1 OINTMENT TOPICAL TWICE DAILY
Qty: 1 | Refills: 1 | Status: DISCONTINUED | COMMUNITY
Start: 2021-07-12 | End: 2023-07-20

## 2023-07-20 RX ORDER — FLUCONAZOLE 150 MG/1
150 TABLET ORAL
Qty: 1 | Refills: 1 | Status: DISCONTINUED | COMMUNITY
Start: 2021-07-12 | End: 2023-07-20

## 2023-07-20 RX ORDER — FLUCONAZOLE 150 MG/1
150 TABLET ORAL
Qty: 2 | Refills: 1 | Status: DISCONTINUED | COMMUNITY
Start: 2022-12-12 | End: 2023-07-20

## 2023-07-20 NOTE — ASSESSMENT
[FreeTextEntry1] : 39 YO RH woman w/ PMH of  iron deficiency anemia presents with lower back pain that radiates down the left leg and cervical pain that radiates down the left arm. She was at [facility] and had a MR cervical and lumbar spine done with noted herniated disks, C5-C6 and L4-L5. She was discharged on Flexeril with some relief. Clinical exam was noted for an antalgic gait, decreased sensory and vibratory changes to the LLE. She has not yet started PT.  Clinical course will be dependent  upon therapy.\par \par Impression: Cervical and lumbar radiculopathy \par \par Plan:\par [] PT\par [] conservative management including cervical pillow, elevation of legs, stretching, warm compresses, pressure balls etc.\par [] d/c flexeril and start Tizanidine 2 mg q6hrs as needed w/ Meloxicam for two weeks \par \par Communication via telephone in place. The patient will call the office if there is any new neurological complaints and will seek emergent evaluation for concerning red flag symptoms discussed.\par \par

## 2023-07-20 NOTE — HISTORY OF PRESENT ILLNESS
[FreeTextEntry1] : COVID x3 with mild symptoms\par COVID VACCINE FULL.\par \par 37 YO LH woman presents here today for back pain that first started about three weeks ago which has been occurring daily.  The back pain is described as sharp and has a tingling sensation that radiates down the entire anterior/posterior left leg.  The tingling is intermittent in nature and will last minutes. The pain is better when placing two pillows underneath her legs. She has difficulty walking up the stairs and feels unbalanced at times.  The tingling is worse when she is moving around.  She was prescribed Flexeril and has had some relief but is making her  feel somnolent. The pain has been waking her up at night. At the onset of symptoms, she was sick a week prior. She was doing a lot of sitting around that time. \par \par She also complains of left arm tingling around the same time. The tingling is intermittent and will last hours. The tingling is provoked by activity.  The tingling is localized to the distal part of her arm and dorsal hand w/ all the fingers.  She denies any trauma or neck pain. She denies any weakness in that arm. \par \par She  denies associated neurological symptoms including headaches, dizziness, lightheadedness, vision changes, nausea, vomiting, speech difficulty/swallowing, weakness, hearing loss/tinnitus, difficulty moving bowels or urine. \par \par Of note she was at evaluated at [facility] and had a MR cervical spine and lumbar done with noted herniated disks. \par She has a medical history of iron deficiency anemia. She has a history  of a tubal ligation She works as a supervisor. She is single. Former use of tobacco but no illicit drugs. No family history of neurological problems. \par \par

## 2023-07-20 NOTE — PHYSICAL EXAM
[FreeTextEntry1] : Constitutional:  Patient was well-developed, well-nourished and in no acute distress. \par \par Head:  Normocephalic, atraumatic. Tympanic membranes were clear. \par \par Neck:  Supple with full range of motion with pain when turning head to the right \par \par Cardiovascular:  Cardiac rhythm was regular without murmur. There were no carotid bruits. Peripheral pulses were full and symmetric. \par \par Respiratory:  Lungs were clear. \par \par Abdomen:  Soft and nontender. \par \par Spine: tenderness to cervical spine. \par \par Skin:  There were no rashes. \par \par NEUROLOGICAL EXAMINATION:\par \par Mental Status: Patient was alert and oriented. Speech was fluent. There was no dysarthria. \par \par Cranial Nerves: \par \par II: Visual acuity was 20/ 20 bilaterally with finger counting.  Pupils were equal and reactive. Visual fields were full. \par \par III, IV, VI:  Eye movements were full without nystagmus. \par \par V: Facial sensation was intact. \par \par VII: Facial strength was normal. \par \par VIII: Hearing was equal. \par \par IX, X: Palatal movement was normal. Phonation was normal. \par \par XI: Sternocleidomastoids and trapezii were normal. \par \par XII: Tongue was midline and movements normal. There was no lingual atrophy or fasciculations. \par \par Motor Examination: Muscle bulk, tone and strength were normal. \par \par Sensory Examination: Pinprick decreased on the left foot and vibration decreased on the left foot. Joint position sense were intact. \par \par Reflexes: DTRs were 2+ throughout. \par \par Plantar Responses: Plantar responses were flexor. \par \par Coordination/Cerebellar Function: There was no dysmetria on finger to nose or heel to shin testing. \par \par Gait/Stance:  Antalgic gait. Difficulty with tandem Romberg was negative.

## 2023-07-20 NOTE — DATA REVIEWED
[de-identified] : \par ACC: [id number] EXAM: MR ANGIO BRAIN ORDERED BY: THERESA PIEDRA\par \par ACC: [id number] EXAM: MR ANGIO NECK WAW IC ORDERED BY: THERESA PIEDRA\par \par ACC: [id number] EXAM: MR BRAIN WAW IC ORDERED BY: THERESA PIEDRA\par \par PROCEDURE DATE: 07/10/2023\par \par \par \par INTERPRETATION: .\par \par CLINICAL INFORMATION: Left-sided numbness.\par \par TECHNIQUE: Multiplanar multi sequential MRI examination of the brain was performed without the administration of IV gadolinium. MRA images through the neck and [place] were obtained using a combination of 2-D and 3-D time-of-flight acquisition. Post contrast MR angiography of the neck was also performed. The data was then reformatted into a volumetric data set and reviewed as rotational MIP images. 10 cc's of IV Gadavist was administered without immediate complication. 0 cc's was discarded.\par \par COMPARISON: Prior head CT exam from 7/7/2023. No prior MRI examinations are available for comparison.\par \par FINDINGS:\par \par MRI Brain: The brain parenchyma is normal in signal and morphology. There is no evidence of acute ischemia on the diffusion-weighted images. No abnormal brain parenchymal or leptomeningeal enhancement is seen.\par \par Ventricular size and configuration is unremarkable. No abnormal extra axial fluid collections are noted. Flow-voids are noted throughout the major intracranial vessels, on the T2 weighted images, consistent with their patency. The sella turcica and posterior fossa are unremarkable.\par \par Diffuse low marrow signal signal is seen on T1-weighted imaging within the calvarium, clivus, and upper cervical vertebral bodies. Corresponding diffusion reduction is seen in these locations. Findings may be compatible with anemia or less likely other marrow infiltrating process, given the patient's clinical information of anemia.\par \par The paranasal sinuses and mastoid air cells are clear. The orbits appear unremarkable.\par \par MRA Neck: There is a standard anatomic configuration to the aortic arch.\par \par The origins of the great vessels appear unremarkable. The bilateral common carotid arteries and carotid bifurcations appear unremarkable.\par \par The bilateral cervical internal carotid arteries are within normal limits.\par \par The origins of the bilateral vertebral arteries are normal. The bilateral cervical vertebral arteries are normal in course and caliber.\par \par MRA [place]: The bilateral intracranial internal carotid, anterior, and middle cerebral arteries appear unremarkable.\par \par The anterior and bilateral posterior communicating arteries are notable.\par \par The bilateral intradural vertebral arteries, vertebrobasilar junction, basilar artery, and basilar tip appear unremarkable as well as the bilateral posterior cerebral arteries.\par \par IMPRESSION:\par \par MRI BRAIN: No acute intracranial hemorrhage or evidence of acute ischemia. No abnormal intracranial enhancement.\par \par Diffuse low marrow signal signal is seen on T1-weighted imaging within the calvarium, clivus, and upper cervical vertebral bodies. Corresponding diffusion reduction is seen in these locations. Findings may be compatible with anemia or less likely other marrow infiltrating process, given the patient's clinical information of anemia.\par \par MRA NECK AND HEAD: Unremarkable studies.\par \par --- End of Report ---\par \par  [de-identified] : \par \par ACC: [id number] EXAM: MR SPINE LUMBAR ORDERED BY: JOSE MARIN\par \par ACC: [id number] EXAM: MR SPINE CERVICAL ORDERED BY: JOSE MARIN\par \par PROCEDURE DATE: 07/09/2023\par \par \par \par INTERPRETATION: CLINICAL STATEMENT: Left upper extremity radiculopathy. Numbness.\par \par TECHNIQUE: Multiplanar multisequence noncontrast MRI cervical and lumbar spine. Multiple images degraded by motion artifact.\par COMPARISON: Correlation with CT cervical and lumbar spine 7/7/2023.\par \par FINDINGS:\par \par MRI CERVICAL\par \par No compression fracture or spondylolisthesis. No visualized intrathecal or paraspinal mass. Evidence of a partially imaged right pleural effusion.\par \par Reversal of the cervical lordosis. Diffusely diminished T1 signal visualized osseous structures. Varying degrees of multilevel disc desiccation, endplate and uncovertebral spurring. Mild multilevel disc space narrowing, without marrow edema. Facet articulations grossly intact.\par \par C2-C3: No disc herniation or stenosis.\par C3-C4: Disc bulge, effacing the subarachnoid space, resulting in mild central canal and mild bilateral neural foramen stenosis with uncovertebral spurring.\par C4-C5: Disc bulge, impinging upon the ventral cord, resulting in mild central canal stenosis.\par C5-C6: Broad-based left paracentral disc herniation superimposed upon a disc bulge, impinging upon the ventral cord, resulting in mild central canal and mild left neural foramen stenosis with uncovertebral spurring.\par C6-C7: Disc bulge, without stenosis.\par C7-T1: No disc herniation or stenosis.\par \par MRI LUMBAR\par \par Partially sacralized (bilaterally) segment is considered L5 for the purposes of counting.\par \par No visualized intrathecal or paraspinal mass. Nonspecific edema posterior paraspinal subcutaneous fat can be seen in the setting of cellulitis. Diffusely diminished T1 signal visualized osseous structures. No compression fracture or subluxation. Leftward curvature. Mild multilevel disc space narrowing with varying degrees of multilevel disc desiccation and endplate spurring. No visualized spondylolysis defect.\par \par L1-L2: No disc herniation or stenosis.\par L2-L3: Disc bulge with central annular tear, without stenosis.\par L3-L4: Disc bulge with central annular tear, without stenosis.\par L4-L5: Extruded left paracentral disc herniation superimposed upon a disc bulge, demonstrating approximately 1.5 cm inferior migration of disc material, impinging upon the thecal sac, resulting in mild-moderate central canal, left greater than right lateral recess and left greater than right neural foramen stenosis with facet hypertrophy, impinging upon the left L5 nerve roots. Superimposed sequestered disc fragment cannot be excluded in the left lateral recess at this level.\par L5-S1: No disc herniation or stenosis.\par \par IMPRESSION:\par \par MRI CERVICAL\par 1. Reversal of the cervical lordosis may reflect muscle spasm.\par 2. C5-C6 broad-based left paracentral disc herniation superimposed upon a disc bulge, impinging upon the ventral cord, resulting in mild central canal and mild left neural foramen stenosis with uncovertebral spurring.\par 3. Additional findings described in detail above, including a partially imaged right pleural effusion as well as diminished T1 signal visualized osseous structures; the latter of which is nonspecific however can be seen in the setting of red marrow hyperplasia secondary to chronic anemia. Recommend PA and lateral chest radiographs as well as hematologic evaluation.\par \par MRI LUMBAR\par 1. Levoscoliosis.\par 2. Partially sacralized L5 segment. Correlate for signs of Bertolotti syndrome.\par 3. L4-L5 extruded left paracentral disc herniation superimposed upon a disc bulge, demonstrating approximately 1.5 cm inferior migration of disc material, impinging upon the thecal sac, resulting in mild-moderate central canal, left greater than right lateral recess and left greater than right neural foramen stenosis with facet hypertrophy, impinging upon the left L5 nerve roots. Superimposed sequestered fragment cannot be definitively excluded in the left lateral recess at this level.\par 4. Additional findings described in detail above, including diminished T1 signal visualized osseous structures; a nonspecific however can be seen in the setting of red marrow hyperplasia secondary to chronic anemia. Recommend hematologic evaluation.\par \par \par --- End of Report ---\par \par \par \par \par \par SAGE KENDRICK M.D., ATTENDING RADIOLOGIST\par This document has been electronically signed. Jul 10 2023 7:48AM

## 2023-07-24 ENCOUNTER — APPOINTMENT (OUTPATIENT)
Dept: OBGYN | Facility: CLINIC | Age: 38
End: 2023-07-24
Payer: COMMERCIAL

## 2023-07-24 VITALS
BODY MASS INDEX: 37.15 KG/M2 | WEIGHT: 231.13 LBS | HEIGHT: 66 IN | SYSTOLIC BLOOD PRESSURE: 127 MMHG | DIASTOLIC BLOOD PRESSURE: 86 MMHG

## 2023-07-24 DIAGNOSIS — N87.1 MODERATE CERVICAL DYSPLASIA: ICD-10-CM

## 2023-07-24 DIAGNOSIS — N92.0 EXCESSIVE AND FREQUENT MENSTRUATION WITH REGULAR CYCLE: ICD-10-CM

## 2023-07-24 PROCEDURE — 99214 OFFICE O/P EST MOD 30 MIN: CPT

## 2023-07-24 RX ORDER — PANTOPRAZOLE SODIUM 20 MG/1
TABLET, DELAYED RELEASE ORAL
Refills: 0 | Status: ACTIVE | COMMUNITY

## 2023-07-24 NOTE — PLAN
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

## 2023-07-24 NOTE — HISTORY OF PRESENT ILLNESS
[FreeTextEntry1] : 39yo P4 LMP  7/2 here for ED f/u anemia\par pt w know Fe def anemia and reported heavy menses (~ 5 ppd)\par \par asymptomatic anemia but on MRI likely hyperactive bone marrow to keep up\par pt hs not call heme for f/u yet\par \par note on anticoagulation in ED on 16th and [pt reports bleeding for couple day  (around ovulation)

## 2023-07-25 LAB — HPV HIGH+LOW RISK DNA PNL CVX: NOT DETECTED

## 2023-07-27 ENCOUNTER — OUTPATIENT (OUTPATIENT)
Dept: OUTPATIENT SERVICES | Facility: HOSPITAL | Age: 38
LOS: 1 days | Discharge: HOME | End: 2023-07-27

## 2023-07-27 DIAGNOSIS — Z98.51 TUBAL LIGATION STATUS: Chronic | ICD-10-CM

## 2023-07-27 DIAGNOSIS — D50.9 IRON DEFICIENCY ANEMIA, UNSPECIFIED: ICD-10-CM

## 2023-07-28 ENCOUNTER — APPOINTMENT (OUTPATIENT)
Dept: GASTROENTEROLOGY | Facility: CLINIC | Age: 38
End: 2023-07-28
Payer: COMMERCIAL

## 2023-07-28 ENCOUNTER — RESULT REVIEW (OUTPATIENT)
Age: 38
End: 2023-07-28

## 2023-07-28 ENCOUNTER — APPOINTMENT (OUTPATIENT)
Dept: HEMATOLOGY ONCOLOGY | Facility: CLINIC | Age: 38
End: 2023-07-28
Payer: COMMERCIAL

## 2023-07-28 VITALS
OXYGEN SATURATION: 98 % | BODY MASS INDEX: 36.78 KG/M2 | DIASTOLIC BLOOD PRESSURE: 88 MMHG | HEART RATE: 84 BPM | HEIGHT: 65 IN | WEIGHT: 220.77 LBS | TEMPERATURE: 97.4 F | RESPIRATION RATE: 18 BRPM | SYSTOLIC BLOOD PRESSURE: 128 MMHG

## 2023-07-28 VITALS
BODY MASS INDEX: 36.99 KG/M2 | DIASTOLIC BLOOD PRESSURE: 86 MMHG | HEART RATE: 85 BPM | SYSTOLIC BLOOD PRESSURE: 126 MMHG | HEIGHT: 65 IN | WEIGHT: 222 LBS | OXYGEN SATURATION: 98 % | TEMPERATURE: 97.1 F

## 2023-07-28 LAB
BASOPHILS # BLD AUTO: 0.03 K/UL — SIGNIFICANT CHANGE UP (ref 0–0.2)
BASOPHILS NFR BLD AUTO: 0.6 % — SIGNIFICANT CHANGE UP (ref 0–2)
CYTOLOGY CVX/VAG DOC THIN PREP: ABNORMAL
EOSINOPHIL # BLD AUTO: 0.07 K/UL — SIGNIFICANT CHANGE UP (ref 0–0.5)
EOSINOPHIL NFR BLD AUTO: 1.3 % — SIGNIFICANT CHANGE UP (ref 0–6)
HCT VFR BLD CALC: 33.1 % — LOW (ref 34.5–45)
HGB BLD-MCNC: 9.5 G/DL — LOW (ref 11.5–15.5)
IMM GRANULOCYTES NFR BLD AUTO: 0.2 % — SIGNIFICANT CHANGE UP (ref 0–0.9)
LYMPHOCYTES # BLD AUTO: 1.55 K/UL — SIGNIFICANT CHANGE UP (ref 1–3.3)
LYMPHOCYTES # BLD AUTO: 29.2 % — SIGNIFICANT CHANGE UP (ref 13–44)
MCHC RBC-ENTMCNC: 18.7 PG — LOW (ref 27–34)
MCHC RBC-ENTMCNC: 28.7 G/DL — LOW (ref 32–36)
MCV RBC AUTO: 65.3 FL — LOW (ref 80–100)
MONOCYTES # BLD AUTO: 0.33 K/UL — SIGNIFICANT CHANGE UP (ref 0–0.9)
MONOCYTES NFR BLD AUTO: 6.2 % — SIGNIFICANT CHANGE UP (ref 2–14)
NEUTROPHILS # BLD AUTO: 3.31 K/UL — SIGNIFICANT CHANGE UP (ref 1.8–7.4)
NEUTROPHILS NFR BLD AUTO: 62.5 % — SIGNIFICANT CHANGE UP (ref 43–77)
NRBC # BLD: 0 /100 WBCS — SIGNIFICANT CHANGE UP (ref 0–0)
PLATELET # BLD AUTO: 546 K/UL — HIGH (ref 150–400)
RBC # BLD: 5.07 M/UL — SIGNIFICANT CHANGE UP (ref 3.8–5.2)
RBC # FLD: SIGNIFICANT CHANGE UP (ref 10.3–14.5)
RETICS #: 72.1 K/UL — SIGNIFICANT CHANGE UP (ref 25–125)
RETICS/RBC NFR: 1.4 % — SIGNIFICANT CHANGE UP (ref 0.5–2.5)
WBC # BLD: 5.3 K/UL — SIGNIFICANT CHANGE UP (ref 3.8–10.5)
WBC # FLD AUTO: 5.3 K/UL — SIGNIFICANT CHANGE UP (ref 3.8–10.5)

## 2023-07-28 PROCEDURE — 99213 OFFICE O/P EST LOW 20 MIN: CPT

## 2023-07-28 PROCEDURE — 99203 OFFICE O/P NEW LOW 30 MIN: CPT

## 2023-07-28 PROCEDURE — 99214 OFFICE O/P EST MOD 30 MIN: CPT

## 2023-07-28 RX ORDER — MELOXICAM 15 MG/1
15 TABLET ORAL DAILY
Qty: 14 | Refills: 0 | Status: DISCONTINUED | COMMUNITY
Start: 2023-07-20 | End: 2023-07-28

## 2023-07-28 RX ORDER — CHLORHEXIDINE GLUCONATE 4 %
325 (65 FE) LIQUID (ML) TOPICAL DAILY
Qty: 30 | Refills: 2 | Status: DISCONTINUED | COMMUNITY
Start: 2021-11-18 | End: 2023-07-28

## 2023-07-28 RX ORDER — NAPROXEN 500 MG/1
500 TABLET ORAL
Refills: 0 | Status: DISCONTINUED | COMMUNITY
End: 2023-07-28

## 2023-07-28 RX ORDER — TIZANIDINE 2 MG/1
2 TABLET ORAL
Qty: 30 | Refills: 0 | Status: DISCONTINUED | COMMUNITY
Start: 2023-07-20 | End: 2023-07-28

## 2023-07-28 NOTE — PHYSICAL EXAM
[Alert] : alert [Normal Voice/Communication] : normal voice/communication [Healthy Appearing] : healthy appearing [No Acute Distress] : no acute distress [Sclera] : the sclera and conjunctiva were normal [Hearing Threshold Finger Rub Not [place]] : hearing was normal [Normal Lips/Gums] : the lips and gums were normal [Oropharynx] : the oropharynx was normal [Normal Appearance] : the appearance of the neck was normal [No Neck Mass] : no neck mass was observed [No Respiratory Distress] : no respiratory distress [No Acc Muscle Use] : no accessory muscle use [Respiration, Rhythm And Depth] : normal respiratory rhythm and effort [Auscultation Breath Sounds / Voice Sounds] : lungs were clear to auscultation bilaterally [Heart Rate And Rhythm] : heart rate was normal and rhythm regular [Normal S1, S2] : normal S1 and S2 [Murmurs] : no murmurs [None] : no edema [Bowel Sounds] : normal bowel sounds [Abdomen Tenderness] : non-tender [No Masses] : no abdominal mass palpated [Abdomen Soft] : soft [No Spinal Tenderness] : no spinal tenderness [Abnormal Walk] : normal gait [No Clubbing, Cyanosis] : no clubbing or cyanosis of the fingernails [Normal Color / Pigmentation] : normal skin color and pigmentation [] : no rash [No Focal Deficits] : no focal deficits [Oriented To Time, Place, And Person] : oriented to person, place, and time [de-identified] : Obese

## 2023-07-28 NOTE — ASSESSMENT
[FreeTextEntry1] : This is a 36 year old woman with a history of anemia for years. Baseline Hg 9.6g/dl, more recently fell to 6.4g/dl.  Menstrual periods described by patient as heavy.  Will check ferritin and TIBC confirm iron deficiency. SHe does have a child that is said to have a thalassemia trait, will run hemoglobin electrophoresis to see if she has this as well.  Upcoming cone biopsy on 11/26/21.  It is entirely possible that even an immediate dose of IV iron which we are not prepared to do today could not improve her hemoglobin fast enough to get the Hg > 8 to make the procedure optimal.  Patient Recieved blood transfusions and 3 doses of 300mg IV venofer in 2021. Was lost to follow up. Returned to the ER with a hg 6.5g/dl in the beginning of July.  Was transfused 2 units and discharged with ferrous sulfate.  \par \par She has been more reliably taking ferrous sulfate over the past week since discharge, Hg 9.5g/dl.\par \par Reccomended 2 more doses of IV ferraheme this time, this is the preferred IV iron in our institution.  Can do 2 doses which would be better than the 3 venofer infusions that she Recieved in 2022.  Downgrade ferrus sulfate to ferrous gluconate 38mg for better tolerability. Follow up in 4 months to see if she can remain on PO iron versus restarting the IV gain. \par \par Reccomended strongly she follow up with GYN to see if the Menorrhagia can be controlled.

## 2023-07-28 NOTE — HISTORY OF PRESENT ILLNESS
[FreeTextEntry1] : Her recent GYN exam Chang Wong is a 38-year-old black female came for establishing GI care, she was seen at [facility] as an inpatient consult for evaluation of anemia with a hemoglobin of 6 g and she received 2 units of packed cells+ intravenous iron infusions.  She had upper endoscopy and colonoscopy both were negative for the source of anemia.  Her CT scan of the abdomen and pelvis was unremarkable.  Notable for cervical intraepithelial neoplasia II and midcycle bleeding.  She denied excessive menorrhagia.  She denied taking NSAIDs.\par No history of cardiac or pulmonary symptoms.\par She works at [employer]

## 2023-07-28 NOTE — ASSESSMENT
[FreeTextEntry1] : Moderate to severe anemia of iron deficiency to rule out small bowel lesions.  I discussed with her about small bowel capsule endoscopy which will be scheduled for further evaluation.  I advised her to continue to follow-up with the gynecologist.

## 2023-07-28 NOTE — HISTORY OF PRESENT ILLNESS
[de-identified] : This is a 38 year old woman with a history of anemia.  baseline Hg 9.6g/dl more recently fell to 6.4g/dl on .  Patient was instructed to take vitamins but does not recall an iron tablets.  \par Patient had a heavy menstrual period but GYN had thought it was fairly regular\par \par Days 2-5 periods heavier, uses 4 pads a day, and then last s 6 days today.  \par Has been this pattern for most of her life. Menarche at age 11.  \par Had 4 children A1.  \par \par One of her children has a low iron and a  thalassemia trait.  \par \par Patient going for surgery next week.  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   [de-identified] : \par Patient returns for severe iron deficiency anemia. Was teated with IV venofer from November 2021 to December 2021, was lost to follow up after a no donald March 2022. Patient returns after being admitted for severe anemia hg 6.4g/dl.  Was transfused 2 units at [facility] Discharge HG in the 9's\par \par Hg 9.5g/dl today MCV 65.3FL, Plates  546.  \par \par Patient on the red abasic ferrous sulfate.  It does cause her some constipation and abdominal pains.  \par \par Patient feels ok now shes taking the ferrous sulfate more regularly now, but the intake was intermittent in 2022.  \par \par

## 2023-08-01 ENCOUNTER — APPOINTMENT (OUTPATIENT)
Dept: INFUSION THERAPY | Facility: HOSPITAL | Age: 38
End: 2023-08-01

## 2023-08-01 ENCOUNTER — RESULT REVIEW (OUTPATIENT)
Age: 38
End: 2023-08-01

## 2023-08-01 LAB
BASOPHILS # BLD AUTO: 0.05 K/UL — SIGNIFICANT CHANGE UP (ref 0–0.2)
BASOPHILS NFR BLD AUTO: 0.7 % — SIGNIFICANT CHANGE UP (ref 0–2)
EOSINOPHIL # BLD AUTO: 0.06 K/UL — SIGNIFICANT CHANGE UP (ref 0–0.5)
EOSINOPHIL NFR BLD AUTO: 0.9 % — SIGNIFICANT CHANGE UP (ref 0–6)
HCT VFR BLD CALC: 36.2 % — SIGNIFICANT CHANGE UP (ref 34.5–45)
HGB BLD-MCNC: 10.2 G/DL — LOW (ref 11.5–15.5)
IMM GRANULOCYTES NFR BLD AUTO: 0.3 % — SIGNIFICANT CHANGE UP (ref 0–0.9)
LYMPHOCYTES # BLD AUTO: 1.84 K/UL — SIGNIFICANT CHANGE UP (ref 1–3.3)
LYMPHOCYTES # BLD AUTO: 26.9 % — SIGNIFICANT CHANGE UP (ref 13–44)
MCHC RBC-ENTMCNC: 18.8 PG — LOW (ref 27–34)
MCHC RBC-ENTMCNC: 28.2 G/DL — LOW (ref 32–36)
MCV RBC AUTO: 66.8 FL — LOW (ref 80–100)
MONOCYTES # BLD AUTO: 0.46 K/UL — SIGNIFICANT CHANGE UP (ref 0–0.9)
MONOCYTES NFR BLD AUTO: 6.7 % — SIGNIFICANT CHANGE UP (ref 2–14)
NEUTROPHILS # BLD AUTO: 4.42 K/UL — SIGNIFICANT CHANGE UP (ref 1.8–7.4)
NEUTROPHILS NFR BLD AUTO: 64.5 % — SIGNIFICANT CHANGE UP (ref 43–77)
NRBC # BLD: 0 /100 WBCS — SIGNIFICANT CHANGE UP (ref 0–0)
PLATELET # BLD AUTO: 567 K/UL — HIGH (ref 150–400)
RBC # BLD: 5.42 M/UL — HIGH (ref 3.8–5.2)
RBC # FLD: SIGNIFICANT CHANGE UP (ref 10.3–14.5)
WBC # BLD: 6.85 K/UL — SIGNIFICANT CHANGE UP (ref 3.8–10.5)
WBC # FLD AUTO: 6.85 K/UL — SIGNIFICANT CHANGE UP (ref 3.8–10.5)

## 2023-08-01 RX ORDER — IBUPROFEN 200 MG
1 TABLET ORAL
Refills: 0 | DISCHARGE

## 2023-08-09 RX ORDER — NORETHINDRONE ACETATE 5 MG/1
5 TABLET ORAL DAILY
Qty: 30 | Refills: 0 | Status: DISCONTINUED | COMMUNITY
Start: 2023-08-09 | End: 2023-08-09

## 2023-08-10 ENCOUNTER — APPOINTMENT (OUTPATIENT)
Dept: GASTROENTEROLOGY | Facility: CLINIC | Age: 38
End: 2023-08-10
Payer: COMMERCIAL

## 2023-08-10 PROCEDURE — 91110 GI TRC IMG INTRAL ESOPH-ILE: CPT

## 2023-08-10 NOTE — HISTORY OF PRESENT ILLNESS
[FreeTextEntry1] : Patient came for small bowel capsule endoscopy.  Procedure was explained.  Risk and benefits discussed. Patient was attached to data recorder and capsule was given to swallow followed by 1 cup of water Data recorded activated Patient was sent home with advice to return in the evening to return the recorder and download the data analyze.

## 2023-08-10 NOTE — PHYSICAL EXAM
[Alert] : alert [Normal Voice/Communication] : normal voice/communication [Healthy Appearing] : healthy appearing [No Acute Distress] : no acute distress [Sclera] : the sclera and conjunctiva were normal [Hearing Threshold Finger Rub Not [place]] : hearing was normal [Normal Lips/Gums] : the lips and gums were normal [Oropharynx] : the oropharynx was normal [Normal Appearance] : the appearance of the neck was normal [No Neck Mass] : no neck mass was observed [No Respiratory Distress] : no respiratory distress [No Acc Muscle Use] : no accessory muscle use [Respiration, Rhythm And Depth] : normal respiratory rhythm and effort [Auscultation Breath Sounds / Voice Sounds] : lungs were clear to auscultation bilaterally [Heart Rate And Rhythm] : heart rate was normal and rhythm regular [Normal S1, S2] : normal S1 and S2 [Murmurs] : no murmurs [None] : no edema [Bowel Sounds] : normal bowel sounds [Abdomen Tenderness] : non-tender [No Masses] : no abdominal mass palpated [Abdomen Soft] : soft [No Spinal Tenderness] : no spinal tenderness [Abnormal Walk] : normal gait [No Clubbing, Cyanosis] : no clubbing or cyanosis of the fingernails [Normal Color / Pigmentation] : normal skin color and pigmentation [] : no rash [No Focal Deficits] : no focal deficits [Oriented To Time, Place, And Person] : oriented to person, place, and time [de-identified] : Obese

## 2023-08-14 ENCOUNTER — APPOINTMENT (OUTPATIENT)
Dept: INFUSION THERAPY | Facility: HOSPITAL | Age: 38
End: 2023-08-14

## 2023-08-14 ENCOUNTER — RESULT REVIEW (OUTPATIENT)
Age: 38
End: 2023-08-14

## 2023-08-14 LAB
ALBUMIN SERPL ELPH-MCNC: 4.3 G/DL
ALP BLD-CCNC: 95 U/L
ALT SERPL-CCNC: 21 U/L
ANION GAP SERPL CALC-SCNC: 13 MMOL/L
AST SERPL-CCNC: 19 U/L
BASOPHILS # BLD AUTO: 0.04 K/UL — SIGNIFICANT CHANGE UP (ref 0–0.2)
BASOPHILS NFR BLD AUTO: 0.6 % — SIGNIFICANT CHANGE UP (ref 0–2)
BILIRUB SERPL-MCNC: 0.2 MG/DL
BUN SERPL-MCNC: 9 MG/DL
CALCIUM SERPL-MCNC: 9.7 MG/DL
CHLORIDE SERPL-SCNC: 102 MMOL/L
CO2 SERPL-SCNC: 24 MMOL/L
CREAT SERPL-MCNC: 0.61 MG/DL
EGFR: 118 ML/MIN/1.73M2
EOSINOPHIL # BLD AUTO: 0.08 K/UL — SIGNIFICANT CHANGE UP (ref 0–0.5)
EOSINOPHIL NFR BLD AUTO: 1.2 % — SIGNIFICANT CHANGE UP (ref 0–6)
FERRITIN SERPL-MCNC: 347 NG/ML
FERRITIN SERPL-MCNC: 419 NG/ML — HIGH (ref 15–150)
FOLATE SERPL-MCNC: 17 NG/ML
GLUCOSE SERPL-MCNC: 85 MG/DL
HCT VFR BLD CALC: 33.9 % — LOW (ref 34.5–45)
HGB BLD-MCNC: 10 G/DL — LOW (ref 11.5–15.5)
IMM GRANULOCYTES NFR BLD AUTO: 0.4 % — SIGNIFICANT CHANGE UP (ref 0–0.9)
IRON SATN MFR SERPL: 16 %
IRON SERPL-MCNC: 65 UG/DL
LYMPHOCYTES # BLD AUTO: 1.92 K/UL — SIGNIFICANT CHANGE UP (ref 1–3.3)
LYMPHOCYTES # BLD AUTO: 27.9 % — SIGNIFICANT CHANGE UP (ref 13–44)
MCHC RBC-ENTMCNC: 20.9 PG — LOW (ref 27–34)
MCHC RBC-ENTMCNC: 29.5 G/DL — LOW (ref 32–36)
MCV RBC AUTO: 70.9 FL — LOW (ref 80–100)
MONOCYTES # BLD AUTO: 0.43 K/UL — SIGNIFICANT CHANGE UP (ref 0–0.9)
MONOCYTES NFR BLD AUTO: 6.3 % — SIGNIFICANT CHANGE UP (ref 2–14)
NEUTROPHILS # BLD AUTO: 4.37 K/UL — SIGNIFICANT CHANGE UP (ref 1.8–7.4)
NEUTROPHILS NFR BLD AUTO: 63.6 % — SIGNIFICANT CHANGE UP (ref 43–77)
NRBC # BLD: 0 /100 WBCS — SIGNIFICANT CHANGE UP (ref 0–0)
PLATELET # BLD AUTO: 284 K/UL — SIGNIFICANT CHANGE UP (ref 150–400)
POTASSIUM SERPL-SCNC: 4.2 MMOL/L
PROT SERPL-MCNC: 7.7 G/DL
RBC # BLD: 4.78 M/UL — SIGNIFICANT CHANGE UP (ref 3.8–5.2)
RBC # FLD: SIGNIFICANT CHANGE UP (ref 10.3–14.5)
SODIUM SERPL-SCNC: 140 MMOL/L
TIBC SERPL-MCNC: 396 UG/DL
UIBC SERPL-MCNC: 331 UG/DL
VIT B12 SERPL-MCNC: 1134 PG/ML
WBC # BLD: 6.87 K/UL — SIGNIFICANT CHANGE UP (ref 3.8–10.5)
WBC # FLD AUTO: 6.87 K/UL — SIGNIFICANT CHANGE UP (ref 3.8–10.5)

## 2023-09-01 ENCOUNTER — APPOINTMENT (OUTPATIENT)
Dept: OBGYN | Facility: CLINIC | Age: 38
End: 2023-09-01
Payer: COMMERCIAL

## 2023-09-01 DIAGNOSIS — N93.8 OTHER SPECIFIED ABNORMAL UTERINE AND VAGINAL BLEEDING: ICD-10-CM

## 2023-09-01 PROCEDURE — 99213 OFFICE O/P EST LOW 20 MIN: CPT | Mod: 95

## 2023-10-20 ENCOUNTER — APPOINTMENT (OUTPATIENT)
Dept: OBGYN | Facility: CLINIC | Age: 38
End: 2023-10-20

## 2023-11-07 ENCOUNTER — OUTPATIENT (OUTPATIENT)
Dept: OUTPATIENT SERVICES | Facility: HOSPITAL | Age: 38
LOS: 1 days | Discharge: HOME | End: 2023-11-07

## 2023-11-07 DIAGNOSIS — Z98.51 TUBAL LIGATION STATUS: Chronic | ICD-10-CM

## 2023-11-07 DIAGNOSIS — D50.9 IRON DEFICIENCY ANEMIA, UNSPECIFIED: ICD-10-CM

## 2023-11-15 ENCOUNTER — APPOINTMENT (OUTPATIENT)
Dept: HEMATOLOGY ONCOLOGY | Facility: CLINIC | Age: 38
End: 2023-11-15

## 2023-11-28 NOTE — H&P PST ADULT - DOES THIS PATIENT HAVE A HISTORY OF OR HAS BEEN DX WITH HEART FAILURE?

## 2023-12-19 ENCOUNTER — RESULT REVIEW (OUTPATIENT)
Age: 38
End: 2023-12-19

## 2023-12-19 ENCOUNTER — APPOINTMENT (OUTPATIENT)
Dept: HEMATOLOGY ONCOLOGY | Facility: CLINIC | Age: 38
End: 2023-12-19
Payer: COMMERCIAL

## 2023-12-19 VITALS
RESPIRATION RATE: 16 BRPM | WEIGHT: 243.24 LBS | HEIGHT: 65 IN | OXYGEN SATURATION: 99 % | BODY MASS INDEX: 40.53 KG/M2 | TEMPERATURE: 97.4 F | SYSTOLIC BLOOD PRESSURE: 134 MMHG | DIASTOLIC BLOOD PRESSURE: 84 MMHG | HEART RATE: 68 BPM

## 2023-12-19 LAB
BASOPHILS # BLD AUTO: 0.02 K/UL — SIGNIFICANT CHANGE UP (ref 0–0.2)
BASOPHILS # BLD AUTO: 0.02 K/UL — SIGNIFICANT CHANGE UP (ref 0–0.2)
BASOPHILS NFR BLD AUTO: 0.3 % — SIGNIFICANT CHANGE UP (ref 0–2)
BASOPHILS NFR BLD AUTO: 0.3 % — SIGNIFICANT CHANGE UP (ref 0–2)
EOSINOPHIL # BLD AUTO: 0.11 K/UL — SIGNIFICANT CHANGE UP (ref 0–0.5)
EOSINOPHIL # BLD AUTO: 0.11 K/UL — SIGNIFICANT CHANGE UP (ref 0–0.5)
EOSINOPHIL NFR BLD AUTO: 1.6 % — SIGNIFICANT CHANGE UP (ref 0–6)
EOSINOPHIL NFR BLD AUTO: 1.6 % — SIGNIFICANT CHANGE UP (ref 0–6)
HCT VFR BLD CALC: 38 % — SIGNIFICANT CHANGE UP (ref 34.5–45)
HCT VFR BLD CALC: 38 % — SIGNIFICANT CHANGE UP (ref 34.5–45)
HGB BLD-MCNC: 12 G/DL — SIGNIFICANT CHANGE UP (ref 11.5–15.5)
HGB BLD-MCNC: 12 G/DL — SIGNIFICANT CHANGE UP (ref 11.5–15.5)
IMM GRANULOCYTES NFR BLD AUTO: 0.3 % — SIGNIFICANT CHANGE UP (ref 0–0.9)
IMM GRANULOCYTES NFR BLD AUTO: 0.3 % — SIGNIFICANT CHANGE UP (ref 0–0.9)
LYMPHOCYTES # BLD AUTO: 1.61 K/UL — SIGNIFICANT CHANGE UP (ref 1–3.3)
LYMPHOCYTES # BLD AUTO: 1.61 K/UL — SIGNIFICANT CHANGE UP (ref 1–3.3)
LYMPHOCYTES # BLD AUTO: 23 % — SIGNIFICANT CHANGE UP (ref 13–44)
LYMPHOCYTES # BLD AUTO: 23 % — SIGNIFICANT CHANGE UP (ref 13–44)
MCHC RBC-ENTMCNC: 23.8 PG — LOW (ref 27–34)
MCHC RBC-ENTMCNC: 23.8 PG — LOW (ref 27–34)
MCHC RBC-ENTMCNC: 31.6 G/DL — LOW (ref 32–36)
MCHC RBC-ENTMCNC: 31.6 G/DL — LOW (ref 32–36)
MCV RBC AUTO: 75.4 FL — LOW (ref 80–100)
MCV RBC AUTO: 75.4 FL — LOW (ref 80–100)
MONOCYTES # BLD AUTO: 0.42 K/UL — SIGNIFICANT CHANGE UP (ref 0–0.9)
MONOCYTES # BLD AUTO: 0.42 K/UL — SIGNIFICANT CHANGE UP (ref 0–0.9)
MONOCYTES NFR BLD AUTO: 6 % — SIGNIFICANT CHANGE UP (ref 2–14)
MONOCYTES NFR BLD AUTO: 6 % — SIGNIFICANT CHANGE UP (ref 2–14)
NEUTROPHILS # BLD AUTO: 4.81 K/UL — SIGNIFICANT CHANGE UP (ref 1.8–7.4)
NEUTROPHILS # BLD AUTO: 4.81 K/UL — SIGNIFICANT CHANGE UP (ref 1.8–7.4)
NEUTROPHILS NFR BLD AUTO: 68.8 % — SIGNIFICANT CHANGE UP (ref 43–77)
NEUTROPHILS NFR BLD AUTO: 68.8 % — SIGNIFICANT CHANGE UP (ref 43–77)
NRBC # BLD: 0 /100 WBCS — SIGNIFICANT CHANGE UP (ref 0–0)
NRBC # BLD: 0 /100 WBCS — SIGNIFICANT CHANGE UP (ref 0–0)
PLATELET # BLD AUTO: 309 K/UL — SIGNIFICANT CHANGE UP (ref 150–400)
PLATELET # BLD AUTO: 309 K/UL — SIGNIFICANT CHANGE UP (ref 150–400)
RBC # BLD: 5.04 M/UL — SIGNIFICANT CHANGE UP (ref 3.8–5.2)
RBC # BLD: 5.04 M/UL — SIGNIFICANT CHANGE UP (ref 3.8–5.2)
RBC # FLD: 14.1 % — SIGNIFICANT CHANGE UP (ref 10.3–14.5)
RBC # FLD: 14.1 % — SIGNIFICANT CHANGE UP (ref 10.3–14.5)
WBC # BLD: 6.99 K/UL — SIGNIFICANT CHANGE UP (ref 3.8–10.5)
WBC # BLD: 6.99 K/UL — SIGNIFICANT CHANGE UP (ref 3.8–10.5)
WBC # FLD AUTO: 6.99 K/UL — SIGNIFICANT CHANGE UP (ref 3.8–10.5)
WBC # FLD AUTO: 6.99 K/UL — SIGNIFICANT CHANGE UP (ref 3.8–10.5)

## 2023-12-19 PROCEDURE — 99213 OFFICE O/P EST LOW 20 MIN: CPT

## 2023-12-19 RX ORDER — MULTIVIT-MIN/IRON/FOLIC ACID/K 18-600-40
CAPSULE ORAL
Refills: 0 | Status: ACTIVE | COMMUNITY

## 2023-12-19 RX ORDER — BUPROPION HYDROCHLORIDE 150 MG/1
150 TABLET, FILM COATED, EXTENDED RELEASE ORAL
Refills: 0 | Status: ACTIVE | COMMUNITY

## 2023-12-19 RX ORDER — NORETHINDRONE ACETATE 5 MG/1
5 TABLET ORAL DAILY
Qty: 30 | Refills: 0 | Status: DISCONTINUED | COMMUNITY
Start: 2023-08-09 | End: 2023-12-19

## 2023-12-19 RX ORDER — FERROUS GLUCONATE 324(38)MG
324 (38 FE) TABLET ORAL
Qty: 30 | Refills: 1 | Status: ACTIVE | COMMUNITY
Start: 2023-07-28 | End: 1900-01-01

## 2023-12-19 NOTE — HISTORY OF PRESENT ILLNESS
[de-identified] : This is a 38 year old woman with a history of anemia.  baseline Hg 9.6g/dl more recently fell to 6.4g/dl on .  Patient was instructed to take vitamins but does not recall an iron tablets.  \par  Patient had a heavy menstrual period but GYN had thought it was fairly regular\par  \par  Days 2-5 periods heavier, uses 4 pads a day, and then last s 6 days today.  \par  Has been this pattern for most of her life. Menarche at age 11.  \par  Had 4 children A1.  \par  \par  One of her children has a low iron and a  thalassemia trait.  \par  \par  Patient going for surgery next week.  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   [de-identified] : 12/19/23: Patient presents here today for a follow up. She received feraheme x 2, last dose on 8/14/23. Since then she has been taking ferrous gluconate on and off. Continues to have heavy menses. Followed up with GYN who recommended hysteroscopy/endo biopsy for further eval. Patient was in agreement but did not schedule exam. Discussed hormonal therapy and Progesterone IUD, however patient has not made a decision as of yet. Followed up with GI with normal small bowel endoscopy. Denies CP, SOB, fatigue or any other medical complaints at this time.   6/28/23: Patient returns for severe iron deficiency anemia. Was teated with IV venofer from November 2021 to December 2021, was lost to follow up after a no donald March 2022. Patient returns after being admitted for severe anemia hg 6.4g/dl.  Was transfused 2 units at [facility] Discharge HG in the 9's  Hg 9.5g/dl today MCV 65.3FL, Plates  546.    Patient on the red abasic ferrous sulfate.  It does cause her some constipation and abdominal pains.    Patient feels ok now shes taking the ferrous sulfate more regularly now, but the intake was intermittent in 2022.

## 2023-12-19 NOTE — ASSESSMENT
[FreeTextEntry1] : This is a 36 year old woman with a history of anemia for years. Baseline Hg 9.6g/dl, more recently fell to 6.4g/dl. Menstrual periods described by patient as heavy. Will check ferritin and TIBC confirm iron deficiency. SHe does have a child that is said to have a thalassemia trait, will run hemoglobin electrophoresis to see if she has this as well. Upcoming cone biopsy on 11/26/21. It is entirely possible that even an immediate dose of IV iron which we are not prepared to do today could not improve her hemoglobin fast enough to get the Hg > 8 to make the procedure optimal. Patient Recieved blood transfusions and 3 doses of 300mg IV venofer in 2021. Was lost to follow up. Returned to the ER with a hg 6.5g/dl in the beginning of July. Was transfused 2 units and discharged with ferrous sulfate.  Recommended 2 more doses of IV ferraheme, this is the preferred IV iron in our institution. Can do 2 doses which would be better than the 3 venofer infusions that she Recieved in 2022. Downgrade ferrus sulfate to ferrous gluconate 38mg for better tolerability.  Plan: -12/19/23: CBC- h/h 12/38, mcv 75.4, rdw 14.1, plts 309. Patient is no longer anemic. Results reviewed with patient.  b12/folate, ferritin and iron studies pending.  -Continue ferrous gluconate 3x a week  -follow up with GYN for management of menorrhagia  RTC 6 months  Case and management discussed with MD Bansal

## 2023-12-29 LAB
FERRITIN SERPL-MCNC: 43 NG/ML
FOLATE SERPL-MCNC: >20 NG/ML
IRON SATN MFR SERPL: 29 %
IRON SERPL-MCNC: 123 UG/DL
TIBC SERPL-MCNC: 425 UG/DL
UIBC SERPL-MCNC: 302 UG/DL
VIT B12 SERPL-MCNC: 562 PG/ML

## 2024-03-11 NOTE — BRIEF OPERATIVE NOTE - ANTIBIOTIC PROTOCOL
2g Ancef/Followed protocol
Detail Level: Generalized
Instructions: This plan will send the code FBSD to the PM system.  DO NOT or CHANGE the price.
Price (Do Not Change): 0.00

## 2024-06-18 ENCOUNTER — OUTPATIENT (OUTPATIENT)
Dept: OUTPATIENT SERVICES | Facility: HOSPITAL | Age: 39
LOS: 1 days | Discharge: HOME | End: 2024-06-18

## 2024-06-18 DIAGNOSIS — Z98.51 TUBAL LIGATION STATUS: Chronic | ICD-10-CM

## 2024-06-18 DIAGNOSIS — D50.9 IRON DEFICIENCY ANEMIA, UNSPECIFIED: ICD-10-CM

## 2024-06-20 ENCOUNTER — RESULT REVIEW (OUTPATIENT)
Age: 39
End: 2024-06-20

## 2024-06-20 ENCOUNTER — APPOINTMENT (OUTPATIENT)
Dept: HEMATOLOGY ONCOLOGY | Facility: CLINIC | Age: 39
End: 2024-06-20
Payer: COMMERCIAL

## 2024-06-20 VITALS
RESPIRATION RATE: 16 BRPM | HEART RATE: 68 BPM | DIASTOLIC BLOOD PRESSURE: 80 MMHG | OXYGEN SATURATION: 97 % | TEMPERATURE: 97 F | BODY MASS INDEX: 40.69 KG/M2 | WEIGHT: 244.49 LBS | SYSTOLIC BLOOD PRESSURE: 126 MMHG

## 2024-06-20 DIAGNOSIS — D50.9 IRON DEFICIENCY ANEMIA, UNSPECIFIED: ICD-10-CM

## 2024-06-20 LAB
BASOPHILS # BLD AUTO: 0.04 K/UL — SIGNIFICANT CHANGE UP (ref 0–0.2)
BASOPHILS NFR BLD AUTO: 0.6 % — SIGNIFICANT CHANGE UP (ref 0–2)
EOSINOPHIL # BLD AUTO: 0.08 K/UL — SIGNIFICANT CHANGE UP (ref 0–0.5)
EOSINOPHIL NFR BLD AUTO: 1.2 % — SIGNIFICANT CHANGE UP (ref 0–6)
FERRITIN SERPL-MCNC: 36 NG/ML
HCT VFR BLD CALC: 33.2 % — LOW (ref 34.5–45)
HGB BLD-MCNC: 10 G/DL — LOW (ref 11.5–15.5)
IMM GRANULOCYTES NFR BLD AUTO: 0.3 % — SIGNIFICANT CHANGE UP (ref 0–0.9)
IRON SATN MFR SERPL: 8 %
IRON SERPL-MCNC: 41 UG/DL
LYMPHOCYTES # BLD AUTO: 1.97 K/UL — SIGNIFICANT CHANGE UP (ref 1–3.3)
LYMPHOCYTES # BLD AUTO: 30.2 % — SIGNIFICANT CHANGE UP (ref 13–44)
MCHC RBC-ENTMCNC: 20.5 PG — LOW (ref 27–34)
MCHC RBC-ENTMCNC: 30.1 G/DL — LOW (ref 32–36)
MCV RBC AUTO: 68 FL — LOW (ref 80–100)
MONOCYTES # BLD AUTO: 0.42 K/UL — SIGNIFICANT CHANGE UP (ref 0–0.9)
MONOCYTES NFR BLD AUTO: 6.4 % — SIGNIFICANT CHANGE UP (ref 2–14)
NEUTROPHILS # BLD AUTO: 4 K/UL — SIGNIFICANT CHANGE UP (ref 1.8–7.4)
NEUTROPHILS NFR BLD AUTO: 61.3 % — SIGNIFICANT CHANGE UP (ref 43–77)
NRBC # BLD: 0 /100 WBCS — SIGNIFICANT CHANGE UP (ref 0–0)
PLATELET # BLD AUTO: 345 K/UL — SIGNIFICANT CHANGE UP (ref 150–400)
RBC # BLD: 4.88 M/UL — SIGNIFICANT CHANGE UP (ref 3.8–5.2)
RBC # FLD: 18.3 % — HIGH (ref 10.3–14.5)
TIBC SERPL-MCNC: 496 UG/DL
UIBC SERPL-MCNC: 456 UG/DL
WBC # BLD: 6.53 K/UL — SIGNIFICANT CHANGE UP (ref 3.8–10.5)
WBC # FLD AUTO: 6.53 K/UL — SIGNIFICANT CHANGE UP (ref 3.8–10.5)

## 2024-06-20 PROCEDURE — 99214 OFFICE O/P EST MOD 30 MIN: CPT

## 2024-06-20 NOTE — PHYSICAL EXAM
Pt aware to continue liquid diet for 10 days postop, pt states she only has 2 pain pills left from surgery, asking if she is ok to go back on her Hydrocodone with acetaminophen once she finishes that RX, pt states most the pain is from her lower abdominal area something she says was previously discussed and not operated on. [Fully active, able to carry on all pre-disease performance without restriction] : Status 0 - Fully active, able to carry on all pre-disease performance without restriction [Normal] : affect appropriate

## 2024-06-24 NOTE — ASSESSMENT
[FreeTextEntry1] : This is a 36 year old woman with a history of anemia for years. Baseline Hg 9.6g/dl, more recently fell to 6.4g/dl. Menstrual periods described by patient as heavy. Will check ferritin and TIBC confirm iron deficiency. SHe does have a child that is said to have a thalassemia trait, will run hemoglobin electrophoresis to see if she has this as well. Upcoming cone biopsy on 11/26/21. It is entirely possible that even an immediate dose of IV iron which we are not prepared to do today could not improve her hemoglobin fast enough to get the Hg > 8 to make the procedure optimal. Patient Recieved blood transfusions and 3 doses of 300mg IV venofer in 2021. Was lost to follow up. Returned to the ER with a hg 6.5g/dl in the beginning of July. Was transfused 2 units and discharged with ferrous sulfate. Recommended 2 more doses of IV ferraheme, this is the preferred IV iron in our institution. Can do 2 doses which would be better than the 3 venofer infusions that she Recieved in 2022. Downgrade ferrous sulfate to ferrous gluconate 38mg for better tolerability.  Hgb today is 10.0 MCV 68 (from 12.0 on 12/2023)   Plan: - GYN follow up for fibroids/menorrhagia management  - Patient told to take ferrous sulfate 2 pills every other day with vitamin C  I discussed this patient in a pre-clinic session with the fellow including review of clinical status and last labs.  I also saw the patient and discussed history, completed an exam and discussed the plan together with the Resident.  Total time spent today on this patient  34    minutes.  This is a 39 year old woman with history of iron deffeicny anemia.  Patient tolerating IV iron well, Hg 10.0g/dl, recommended she continue PO supplementation with ferrous gluconate 2 tablets every other day to improve absorption rates along with decreasing constipation.

## 2024-06-24 NOTE — HISTORY OF PRESENT ILLNESS
[de-identified] : This is a 38 year old woman with a history of anemia.  baseline Hg 9.6g/dl more recently fell to 6.4g/dl on November 12th.  Patient was instructed to take vitamins but does not recall an iron tablets.    [de-identified] : 6/20/24: Patient presents for follow up. Patient taking "red pill" (likely ferrous sulfate) daily as ferrous gluconate isn't covered by insurance. Patient is tolerating the iron well, with no heartburn or constipation. Patient denies dizziness, SOB, lightheadendess. Patient LMP was 6/7/24. Patient recent was told she has fibroids per pelvic MRI, largest being 4cm. Patient will get endometrial biopsy tomorrow and depending on the biopsies will undergo fibroidectomy or other interventions.   12/19/23: Patient presents here today for a follow up. She received feraheme x 2, last dose on 8/14/23. Since then she has been taking ferrous gluconate on and off. Continues to have heavy menses. Followed up with GYN who recommended hysteroscopy/endo biopsy for further eval. Patient was in agreement but did not schedule exam. Discussed hormonal therapy and Progesterone IUD, however patient has not made a decision as of yet. Followed up with GI with normal small bowel endoscopy. Denies CP, SOB, fatigue or any other medical complaints at this time.   6/28/23: Patient returns for severe iron deficiency anemia. Was teated with IV venofer from November 2021 to December 2021, was lost to follow up after a no donald March 2022. Patient returns after being admitted for severe anemia hg 6.4g/dl.  Was transfused 2 units at [facility] HG in the 9's  Hg 9.5g/dl today MCV 65.3FL, Plates  546.    Patient on the red abasic ferrous sulfate.  It does cause her some constipation and abdominal pains.    Patient feels ok now shes taking the ferrous sulfate more regularly now, but the intake was intermittent in 2022.

## 2025-06-27 NOTE — H&P PST ADULT - CENTRAL VENOUS CATHETER
no
PAST MEDICAL HISTORY:  Abnormal uterine and vaginal bleeding, unspecified     Asthma     Hair loss     History of hay fever     HTN (hypertension)     Hyperlipidemia (diet control)    Knee pain, left     Magnesium deficiency     Malignant pleural effusion     Morbid obesity with body mass index (BMI) of 40.0 to 44.9 in adult     Obesity     Osteopenia     Type 2 diabetes mellitus

## (undated) DEVICE — BITE BLOCK ADULT 20 X 27MM (GREEN)

## (undated) DEVICE — TUBING SUCTION 20FT

## (undated) DEVICE — SOL INJ NS 0.9% 500ML 2 PORT

## (undated) DEVICE — BRUSH COLONOSCOPY CYTOLOGY

## (undated) DEVICE — FORCEP RADIAL JAW 4 JUMBO 2.8MM 3.2MM 240CM ORANGE DISP

## (undated) DEVICE — BALLOON US ENDO

## (undated) DEVICE — CATH IV SAFE BC 22G X 1" (BLUE)

## (undated) DEVICE — SENSOR O2 FINGER ADULT

## (undated) DEVICE — FOLEY HOLDER STATLOCK 2 WAY ADULT

## (undated) DEVICE — CLAMP BX HOT RAD JAW 3

## (undated) DEVICE — SYR ALLIANCE II INFLATION 60ML

## (undated) DEVICE — SUCTION YANKAUER NO CONTROL VENT

## (undated) DEVICE — ELCTR GROUNDING PAD ADULT COVIDIEN

## (undated) DEVICE — SYR LUER LOK 50CC

## (undated) DEVICE — TUBING SUCTION CONN 6FT STERILE

## (undated) DEVICE — POLY TRAP ETRAP

## (undated) DEVICE — BIOPSY FORCEP RADIAL JAW 4 STANDARD WITH NEEDLE

## (undated) DEVICE — TUBING IV SET GRAVITY 3Y 100" MACRO

## (undated) DEVICE — IRRIGATOR BIO SHIELD

## (undated) DEVICE — CATH IV SAFE BC 20G X 1.16" (PINK)

## (undated) DEVICE — PACK IV START WITH CHG